# Patient Record
Sex: FEMALE | Race: WHITE | NOT HISPANIC OR LATINO | Employment: FULL TIME | URBAN - METROPOLITAN AREA
[De-identification: names, ages, dates, MRNs, and addresses within clinical notes are randomized per-mention and may not be internally consistent; named-entity substitution may affect disease eponyms.]

---

## 2023-12-27 ENCOUNTER — HOSPITAL ENCOUNTER (OUTPATIENT)
Dept: BEHAVIORAL HEALTH | Facility: CLINIC | Age: 38
Discharge: HOME | End: 2023-12-27
Attending: FAMILY MEDICINE | Admitting: FAMILY MEDICINE
Payer: COMMERCIAL

## 2023-12-27 PROCEDURE — 90791 PSYCH DIAGNOSTIC EVALUATION: CPT | Performed by: COUNSELOR

## 2023-12-27 RX ORDER — SPIRONOLACTONE 100 MG/1
100 TABLET, FILM COATED ORAL DAILY
COMMUNITY

## 2023-12-27 RX ORDER — BUPROPION HYDROCHLORIDE 450 MG/1
450 TABLET, FILM COATED, EXTENDED RELEASE ORAL DAILY
COMMUNITY

## 2023-12-27 RX ORDER — CLONAZEPAM 0.5 MG/1
0.5 TABLET, ORALLY DISINTEGRATING ORAL 2 TIMES DAILY PRN
COMMUNITY

## 2023-12-27 RX ORDER — CETIRIZINE HYDROCHLORIDE 5 MG/1
5 TABLET ORAL DAILY
COMMUNITY

## 2023-12-27 RX ORDER — MULTIVITAMIN,THERAPEUTIC
1 TABLET ORAL DAILY
COMMUNITY

## 2023-12-27 RX ORDER — BUSPIRONE HYDROCHLORIDE 30 MG/1
30 TABLET ORAL 2 TIMES DAILY
COMMUNITY

## 2023-12-27 ASSESSMENT — ANXIETY QUESTIONNAIRES
6. BECOMING EASILY ANNOYED OR IRRITABLE: MORE THAN HALF THE DAYS
3. WORRYING TOO MUCH ABOUT DIFFERENT THINGS: MORE THAN HALF THE DAYS
1. FEELING NERVOUS, ANXIOUS, OR ON EDGE: SEVERAL DAYS
GAD7 TOTAL SCORE: 9
2. NOT BEING ABLE TO STOP OR CONTROL WORRYING: MORE THAN HALF THE DAYS
GAD7 TOTAL SCORE: 9
7. FEELING AFRAID AS IF SOMETHING AWFUL MIGHT HAPPEN: SEVERAL DAYS
4. TROUBLE RELAXING: SEVERAL DAYS
IF YOU CHECKED OFF ANY PROBLEMS ON THIS QUESTIONNAIRE, HOW DIFFICULT HAVE THESE PROBLEMS MADE IT FOR YOU TO DO YOUR WORK, TAKE CARE OF THINGS AT HOME, OR GET ALONG WITH OTHER PEOPLE: SOMEWHAT DIFFICULT
5. BEING SO RESTLESS THAT IT IS HARD TO SIT STILL: NOT AT ALL

## 2023-12-27 ASSESSMENT — COLUMBIA-SUICIDE SEVERITY RATING SCALE - C-SSRS
6. HAVE YOU EVER DONE ANYTHING, STARTED TO DO ANYTHING, OR PREPARED TO DO ANYTHING TO END YOUR LIFE?: NO
2. HAVE YOU ACTUALLY HAD ANY THOUGHTS OF KILLING YOURSELF IN THE PAST MONTH?: YES
4. HAVE YOU HAD THESE THOUGHTS AND HAD SOME INTENTION OF ACTING ON THEM?: NO
2. HAVE YOU ACTUALLY HAD ANY THOUGHTS OF KILLING YOURSELF LIFETIME?: NO
1. IN THE PAST MONTH, HAVE YOU WISHED YOU WERE DEAD OR WISHED YOU COULD GO TO SLEEP AND NOT WAKE UP?: YES
1. IN THE PAST MONTH, HAVE YOU WISHED YOU WERE DEAD OR WISHED YOU COULD GO TO SLEEP AND NOT WAKE UP?: NO
3. HAVE YOU BEEN THINKING ABOUT HOW YOU MIGHT KILL YOURSELF?: NO
5. HAVE YOU STARTED TO WORK OUT OR WORKED OUT THE DETAILS OF HOW TO KILL YOURSELF? DO YOU INTEND TO CARRY OUT THIS PLAN?: NO

## 2023-12-27 ASSESSMENT — PATIENT HEALTH QUESTIONNAIRE - PHQ9: SUM OF ALL RESPONSES TO PHQ QUESTIONS 1-9: 13

## 2023-12-27 ASSESSMENT — PAIN SCALES - GENERAL: PAINLEVEL: NO PAIN (0)

## 2023-12-27 NOTE — PROGRESS NOTES
LOCUS Worksheet     Name: Mariah Gaytan MRN: [id number]    : 1985      Gender:  female    PMI:  NA   Provider Name: [facility]   Provider NPI:  [id number]    Actual level of Care Provided:  therapy and psychiatry    Service(s) receiving or referred to:  IOP    Reason for Variance: Due to worsening mental health symptoms, decline in functioning and suicidal ideations.      Rating completed by: Nico Montes De Oca LPCC/GLORIA      I. Risk of Harm:   2      Low Risk of Harm    II. Functional Status:   3      Moderate Impairment    III. Co-Morbidity:   2      Minor Co-Morbidity    IV - A. Recovery Environment - Level of Stress:   3      Moderately Stress Environment    IV - B. Recovery Environment - Level of Support:   2      Supportive Environment    V. Treatment and Recovery History:   3      Moderate to Equivocal Response to Treatment and Recovery Management    VI. Engagement and Recovery Project:   3      Limited Engagement and Recovery       18 Composite Score    Level of Care Recommendation:   17 to 19       High Intensity Community Based Services

## 2023-12-27 NOTE — PROGRESS NOTES
"[facility]      PATIENT'S NAME: Mariah Gaytan  PREFERRED NAME: Mariah  PRONOUNS:   she/her    MRN: [id number]  : 1985  ADDRESS: [address]  [address]  [facility] [address]  [facility]. NUMBER:  [id number]  DATE OF SERVICE: 23  START TIME: 10:30 AM  END TIME: 12:19 PM  PREFERRED PHONE: [phone]  May we leave a program related message: Yes  EMERGENCY CONTACT: was obtained for Bubba Gaytan (brother) phone:[phone] .  SERVICE MODALITY:  In-person    [facility] ADULT Mental Health DIAGNOSTIC ASSESSMENT    Identifying Information:  Patient is a 38 year old,    individual.  Patient was referred for an assessment by self .  Patient attended the session alone.    Chief Complaint:   The reason for seeking services at this time is: \"Mental health has been horrible and also unhappy for a very long time. Patient reports that she went to [facility] for 3 weeks of PHP at the end of Nov to Dec, got denies IOP at [facility] because of occasional use of alcohol and marijuana. She reports that her  was not happy about how she was being treated by [facility]. 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 Patient reports that she is on 26 weeks of medical leave from work as short-term disability.\"   The problem(s) began at the age of 20, manageable but worsening after mom  in . Patient has attempted to resolve these concerns in the past through psychologist, psychiatry, and PHP .    Social/Family History:  Patient reported that she grew up in  [place], moved to [place] in  where she went to the [employer]  . She was raised by biological parents.  Parents  13 years ago when the patient was 25 years old. The patient's  mother  13 years ago. The patient's " father did not remarry and remains single. Patient reported that her childhood was mostly okay.  Patient described her current relationships with family of origin as okay.      The patient describes her cultural background as .  Cultural influences and impact on patient's life structure, values, norms, and healthcare:  none identified .  Contextual influences on patient's health include: NA.  Cultural, Contextual, and socioeconomic factors do not affect the patient's access to services.  These factors will be addressed in the Preliminary Treatment plan.  Patient identified her preferred language to be English. Patient reported that she does not  need the assistance of an  or other support involved in therapy.     Patient reported had no significant delays in developmental tasks.   Patient's highest education level was college graduate. Patient identified the following learning problems: none reported.  Modifications will be used to assist communication in therapy.  Patient reports that she is  able to understand written materials.    Patient reported the following relationship history: single never .  Patient's current relationship status is single for 2013.   Patient identified her sexual orientation as heterosexual.  Patient reported having zero child(elizabeth). Patient identified father, siblings, friends, and  as part of her support system.  Patient identified the quality of these relationships as good.     Patient's current living/housing situation involves staying in own home/apartment. She lives with her oldest brother and reports that housing is stable.     Patient is currently on medical leave from 26 weeks (short-term disability) .  Patient reports that her finances are obtained through employment.  Patient does not identify finances as a current stressor.      Patient reported that she has not been involved with the legal system. Patient denies being on probation / parole  / under the jurisdiction of the court.    Patient's Strengths and Limitations:  Patient identified the following strengths or resources that will help them succeed in treatment: commitment to health and well being, community involvement, exercise routine, friends / good social support, family support, insight, intelligence, and motivation. Things that may interfere with the patient's success in treatment include: financial hardship.     Assessments:  The following assessments were completed by patient for this visit:  PHQ9:       12/27/2023    10:00 AM   PHQ-9 SCORE   PHQ-9 Total Score 13     GAD7:       12/27/2023    10:00 AM   KURT-7 SCORE   Total Score 9     CAGE-AID:       12/27/2023    10:00 AM   CAGE-AID Total Score   Total Score 0     PROMIS 10-Global Health (all questions and answers displayed):       12/27/2023    10:00 AM   PROMIS 10   In general, would you say your health is: 3   In general, would you say your quality of life is: 2   In general, how would you rate your physical health? 3   In general, how would you rate your mental health, including your mood and your ability to think? 1   In general, how would you rate your satisfaction with your social activities and relationships? 2   In general, please rate how well you carry out your usual social activities and roles. (This includes activities at home, at work and in your community, and responsibilities as a parent, child, spouse, employee, friend, etc.) 3   To what extent are you able to carry out your everyday physical activities such as walking, climbing stairs, carrying groceries, or moving a chair? 5   In the past 7 days, how often have you been bothered by emotional problems such as feeling anxious, depressed, or irritable? 4   In the past 7 days, how would you rate your fatigue on average? 3   In the past 7 days, how would you rate your pain on average, where 0 means no pain, and 10 means worst imaginable pain? 3   Global Mental Health Score 7    Global Physical Health Score 15   PROMIS TOTAL - SUBSCORES 22     [place] Suicide Severity Rating Scale (Lifetime/Recent)      12/27/2023    10:00 AM   [place] Suicide Severity Rating (Lifetime/Recent)   Q1 Wish to be Dead (Lifetime) No   Q2 Non-Specific Active Suicidal Thoughts (Lifetime) No   Q1 Wished to be Dead (Past Month) yes   Q2 Suicidal Thoughts (Past Month) yes   Q3 Suicidal Thought Method no   Q4 Suicidal Intent without Specific Plan no   Q5 Suicide Intent with Specific Plan no   Q6 Suicide Behavior (Lifetime) no   Level of Risk per Screen low risk       Personal and Family Medical History:  Patient does not report a family history of mental health concerns.  Patient reports family history is not on file..     Patient does report Mental Health Diagnosis and/or Treatment.  Patient reported the following previous diagnoses which include(s): an Anxiety Disorder and Depression.  Patient reported symptoms began at 20.   Patient has received mental health services in the past: therapy, psychiatry, and partial hospitalization program.  Psychiatric Hospitalizations: [facility] for a week in 2014, had a manic episode due to side effects of medication. .   Patient denies a history of civil commitment.  Patient is receiving other mental health services.  These include psychotherapy with Malou Salmeron every other week, and psychiatry with Dr Carolina Pinto.  Next appointment: just had an appointment with her last week and will follow up soon.       Patient has had a physical exam to rule out medical causes for current symptoms.  Date of last physical exam was within the past year. Client was encouraged to follow up with PCP if symptoms were to develop. The patient has a non-[facility] Primary Care Provider. Their PCP is Vanita Avel[facility].  Patient reports no current medical and/or dental concerns.  Patient denies any issues with pain..   There are not significant appetite / nutritional concerns / weight  changes. These may include: no concerns. Patient reports the following sleep concerns:  staying up too late.   Patient does not report a history of head injury / trauma / cognitive impairment.      Patient reports current meds as:   Outpatient Medications Marked as Taking for the 12/27/23 encounter (Hospital Encounter) [facility]   Medication Sig    buPROPion HCl ER, XL, 450 MG TB24 Take 450 mg by mouth daily    busPIRone HCl (BUSPAR) 30 MG tablet Take 15 mg by mouth 2 times daily    cetirizine (ZYRTEC) 5 MG tablet Take 5 mg by mouth daily    clonazePAM (KLONOPIN) 0.5 MG ODT Take 0.5 mg by mouth 2 times daily as needed for anxiety    levonorgestrel (MIRENA) 52 MG (20 mcg/day) IUD by Intrauterine route once    multivitamin, therapeutic (THERA-VIT) TABS tablet Take 1 tablet by mouth daily    spironolactone (ALDACTONE) 100 MG tablet Take 100 mg by mouth daily       Medication Adherence:  Patient reports taking prescribed medications as prescribed.    Patient Allergies:  Not on File    Medical History:  No past medical history on file.      Current Mental Status Exam:   Appearance:  Appropriate    Eye Contact:  Good   Psychomotor:  Normal       Gait / station:  no problem  Attitude / Demeanor: Cooperative  Interested  Speech      Rate / Production: Normal/ Responsive      Volume:  Normal  volume      Language:  intact  Mood:   Anxious  Depressed   Affect:   Appropriate    Thought Content: Clear   Thought Process: Coherent  Goal Directed       Associations: No loosening of associations  Insight:   Good   Judgment:  Intact   Orientation:  Person Place Time Situation  Attention/concentration: Good    Substance Use:   Patient did report a family history of substance use concerns; see medical history section for details.  Patient has not received chemical dependency treatment in the past.  Patient has never been to detox.      Patient is not currently receiving any chemical dependency treatment.  Patient reported the following problems as a result of her substance use:   none reported .    Patient reports drinking 1-2 drinks twice a week  Patient denies using tobacco.  Patient reports using THC gummies once or twice weekly  Patient reports drinking a strong cup of tea every mnorning.  Patient reports using/abusing the following substance(s). Patient reported no other substance use.     Substance Use: No symptoms    Based on the negative CAGE score and clinical interview there  are indications of drug or alcohol abuse. Recommendation for substance abuse disorder evaluation with a substance use professional was given. Therapist did recommend client to reduce use or abstain from alcohol or substance use. Therapist did not recommend structured treatment and or community support (AA, 12 step group, etc.). NA .    Significant Losses / Trauma / Abuse / Neglect Issues:   Patient did not serve in the .  There are indications or report of significant loss, trauma, abuse or neglect issues related to: death of her mother in 2010 .  Concerns for possible neglect are not present.     Safety Assessment:   Patient denies current homicidal ideation and behaviors.  Patient denies current self-injurious ideation and behaviors.    Patient denied risk behaviors associated with substance use.   Patient reported substance use associated with mental health symptoms.  Patient reports the following current concerns for her personal safety: None.  Patient reports there are no firearms in the house.       There are no firearms in the home..    History of Safety Concerns:  Patient denied a history of homicidal ideation.     Patient denied a history of personal safety concerns.    Patient denied a history of assaultive behaviors.    Patient denied a history of sexual assault behaviors.     Patient denied a history of risk behaviors associated with substance use.  Patient reported a history of substance use associated with mental  health symptoms.  Patient reports the following protective factors:  forward or future oriented thinking; dedication to family or friends; safe and stable environment; regular sleep; effectively controls impulses; sense of belonging; purpose; help seeking behaviors when distressed; adherence with prescribed medication; agreement to use safety plan; living with other people; uses community crisis resources; effective problem solving skills; commitment to well-being; sense of meaning; positive social skills; healthy fear of risky behaviors or pain; strong sense of self-worth or esteem; sense of personal control or determination; access to a variety of clinical interventions.     Risk Plan:  See Recommendations for Safety and Risk Management Plan    Review of Symptoms per patient report:   Depression: Change in sleep, Lack of interest, Excessive or inappropriate guilt, Change in energy level, Suicidal ideation, Feelings of hopelessness, Feelings of helplessness, Low self-worth, Ruminations, Irritability, Feeling sad, down, or depressed, Withdrawn, Poor hygeine, and Frequent crying  Telma:  No Symptoms  Psychosis: No Symptoms  Anxiety: Excessive worry, Nervousness, Physical complaints, such as headaches, stomachaches, muscle tension, Social anxiety, Sleep disturbance, Ruminations, and Irritability  Panic:  No symptoms  Post Traumatic Stress Disorder:  No Symptoms   Eating Disorder: No Symptoms  ADD / ADHD:  No symptoms  Conduct Disorder: No symptoms  Autism Spectrum Disorder: No symptoms  Obsessive Compulsive Disorder: No Symptoms    Patient reports the following compulsive behaviors and treatment history:  none reported .      Diagnostic Criteria:     Generalized Anxiety Disorder  A. Excessive anxiety and worry about a number of events or activities (such as work or school performance).   B. The person finds it difficult to control the worry.  C. Select 3 or more symptoms (required for diagnosis). Only one item is  required in children.   - Restlessness or feeling keyed up or on edge.    - Being easily fatigued.    - Difficulty concentrating or mind going blank.    - Irritability.    - Muscle tension.    - Sleep disturbance (difficulty falling or staying asleep, or restless unsatisfying sleep).   D. The focus of the anxiety and worry is not confined to features of an Axis I disorder.  E. The anxiety, worry, or physical symptoms cause clinically significant distress or impairment in social, occupational, or other important areas of functioning.   F. The disturbance is not due to the direct physiological effects of a substance (e.g., a drug of abuse, a medication) or a general medical condition (e.g., hyperthyroidism) and does not occur exclusively during a Mood Disorder, a Psychotic Disorder, or a Pervasive Developmental Disorder.     Major Depressive Disorder  CRITERIA (A-C) REPRESENT A MAJOR DEPRESSIVE EPISODE - SELECT THESE CRITERIA  A) Recurrent episode(s) - symptoms have been present during the same 2-week period and represent a change from previous functioning 5 or more symptoms (required for diagnosis)   - Depressed mood. Note: In children and adolescents, can be irritable mood.     - Diminished interest or pleasure in all, or almost all, activities.    - Decreased sleep.     - Fatigue or loss of energy.    - Feelings of worthlessness or inappropriate guilt.    - Recurrent thoughts of death (not just fear of dying), recurrent suicidal ideation without a specific plan, or a suicide attempt or a specific plan for committing suicide.   B) The symptoms cause clinically significant distress or impairment in social, occupational, or other important areas of functioning  C) The episode is not attributable to the physiological effects of a substance or to another medical condition  D) The occurence of major depressive episode is not better explained by other thought / psychotic disorders  E) There has never been a manic episode  or hypomanic episode    Functional Status:  Patient reports the following functional impairments:  self-care, social interactions, and work / vocational responsibilities.     Programmatic care:  Current LOCUS was assigned and patient needs the following level of care based on score 18  .    Clinical Summary:  1. Psychosocial, Cultural and Contextual Factors: isolation, short term disability.  2. Principal DSM5 Diagnoses  (Sustained by DSM5 Criteria Listed Above):   296.32 (F33.1) Major Depressive Disorder, Recurrent Episode, Moderate _ and With melancholic features.  3. Other Diagnoses that is relevant to services:   300.02 (F41.1) Generalized Anxiety Disorder.  4. Provisional Diagnosis: none.  5. Prognosis: Expect Improvement and Relieve Acute Symptoms.  6. Likely consequences of symptoms if not treated: patient's ongoing symptoms are more than likely to get worse and experience a decreased daily in functioning and may require a higher level of care.  7. Client strengths include:  caring, creative, educated, empathetic, employed, goal-focused, good listener, has a previous history of therapy, insightful, intelligent, motivated, open to learning, open to suggestions / feedback, support of family, friends and providers, supportive, wants to learn, willing to ask questions, and willing to relate to others .     Recommendations:     1. Plan for Safety and Risk Management:   Safety and Risk: A safety and risk management plan has been developed including: Patient consented to co-developed safety plan.  Safety and risk management plan was completed - see below.  Patient agreed to use safety plan should any safety concerns arise.  A copy was given to the patient..          Report to child / adult protection services was NA.     2. Patient's not identified aith / Taoist / spiritual influences relevant to programming at this time.    3. Initial Treatment will focus on:   Depressed Mood -    Anxiety -    Functional  "Impairment at: work  Risk Management / Safety Concerns related to: Suicidal ideation.     4. Resources/Service Plan:    services are not indicated.   Modifications to assist communication are not indicated.   Additional disability accommodations are not indicated.      5. Collaboration:   Collaboration / coordination of treatment will be initiated with the following  support professionals: Targeted Case Management (TCM).      6.  Referrals:   The following referral(s) will be initiated: Outpatient Mental Health Therapy Group.       A Release of Information has been obtained for the following:  EC listed above .     Clinical Substantiation/medical necessity for the above recommendations:  Patient is a 38-year-old heterosexual  single female who presents with a history of anxiety disorder, and Depression. Patient is seeking services currently due to \"Mental health has been horrible and also unhappy for a very long time. Patient reports that she went to [facility] at [facility] for 3 weeks of PHP at the end of Nov to Dec, got denies IOP at [facility] because of occasional use of alcohol and marijuana. She reports that her  was not happy about how she was being treated by [facility]. 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 Patient reports that she is on 26 weeks of medical leave from work as short-term disability.\" Patient has received mental health services in the past: therapy, psychiatry, and partial hospitalization program.  Psychiatric Hospitalizations: [facility] for a [facility] in , had a manic episode due to side effects of medication   Patient denies a history of civil commitment, psychiatric hospitalization or substance use disorder treatment.  Patient is receiving psychotherapy with Malou Salmeron every other week, and " psychiatry with Dr Carolina Pinto.  Next appointment: just had an appointment with her last week and will follow up soon.        Patient endorses with Change in sleep, Lack of interest, Excessive or inappropriate guilt, change in energy level, Suicidal ideation, Feelings of hopelessness, Feelings of helplessness, Low self-worth, Ruminations, Irritability, feeling sad, down, or depressed, Withdrawn, Poor hygiene, and Frequent crying. Excessive worry, Nervousness, Physical complaints, such as headaches, stomachaches, muscle tension, social anxiety, Sleep disturbance, Ruminations, and Irritability.     Patient denies any prior suicidal behaviors but admits to current suicidal ideations w/o plan in the past month. She was engaged in safety planning and identified numerous protective factors. Patient agrees with referral to [facility] and a referral has been made through the navigation process for programming. Patient's acute suicide risk was determined to be low due to the following factors: Admission of current suicidal ideations w/o plan and has no past suicidal behaviors. Patient is not currently under the influence of alcohol or illicit substances, denies experiencing command hallucinations, and has no direct access to firearms. Patient's acute risk could be higher if noncompliant with treatment plan, medications, follow-up appointments or using illicit substances or alcohol. Protective factors include forward or future oriented thinking; dedication to family or friends; safe and stable environment; regular sleep; effectively controls impulses; sense of belonging; purpose; help seeking behaviors when distressed; adherence with prescribed medication; agreement to use safety plan; living with other people; uses community crisis resources; effective problem solving skills; commitment to well-being; sense of meaning; positive social skills; healthy fear of risky behaviors or pain; strong sense of  "self-worth or esteem; sense of personal control or determination; access to a variety of clinical interventions. Patient reports suicidal ideations w/o plan in the past month and has no past suicidal behaviors, and is not currently using illicit substances, therefore, a safety plan was developed. Patient instructed to present to her nearest emergency room if symptoms get exacerbate.    7. GISELLE:    GISELLE:  Discussed the general effects of drugs and alcohol on health and well-being. Provider gave patient printed information about the effects of chemical use on their health and well being. Recommendations:  none.     8. Records:   These were reviewed at time of assessment.   Information in this assessment was obtained from the medical record and  provided by patient who is a fair historian.    Patient will have open access to their mental health medical record.    9.   Interactive Complexity: No    10. Safety Plan:  When the patient identifies the following:  Wish to die    The following is recommended:   Complete/Review/Update Safety Plan    Safety Plan:  Adult Long Safety Plan:     [facility]                                       Mariah Gaytan     SAFETY PLAN:  Step 1: Warning signs / cues (Thoughts, images, mood, situation, behavior) that a crisis may be developing:  Thoughts: \"I don't matter\", \"People would be better off without me\", \"I'm a burden\", \"I can't do this anymore\", \"I just want this to end\", and \"Nothing makes it better\"  Images: obsessive thoughts of death or dying: ideations and passive thoughts  Thinking Processes: ruminations (can't stop thinking about my problems): of the past, racing thoughts, intrusive thoughts (bothersome, unwanted thoughts that come out of nowhere):  , and highly critical and negative thoughts: of self  Mood: worsening depression, hopelessness, helplessness, and intense worry  Behaviors: isolating/withdrawing , using drugs, " "using alcohol, can't stop crying, and not taking care of my responsibilities  Situations: loss: her mother and changes in symptoms: depression and anxiety    Step 2: Coping strategies - Things I can do to take my mind off of my problems without contacting another person (relaxation technique, physical activity):  Distress Tolerance Strategies:  arts and crafts:  , listen to positive and upbeat music:  , change body temperature (ice pack/cold water) , and paced breathing/progressive muscle relaxation  Physical Activities: meditation and deep breathing  Focus on helpful thoughts:  \"This is temporary\", \"I will get through this\", \"It always passes\", and \"Ride the wave\"  Step 3: People and social settings that provide distraction:   Name: therapist, father, brothers and friends Phone: NA   Name: FAUSTINO Phone: NA   Name: NA Phone: NA  coffee shop, volunteering, community center, gym , and work   Step 4: Remind myself of people and things that are important to me and worth living for:  friends and family.      Step 5: When I am in crisis, I can ask these people to help me use my safety plan:   Name: therapist, father, brothers and friends Phone: NA   Name: NA Phone: NA   Name: NA Phone: NA  Step 6: Making the environment safe:   be around others  Step 7: Professionals or agencies I can contact during a crisis:  Suicide Prevention Lifeline: Call or Text 880   Local Crisis Services: [facility]  [phone] (adults)  [phone] (children)    Call 911 or go to my nearest emergency department.   I helped develop this safety plan and agree to use it when needed.  I have been given a copy of this plan.      Client signature _________________________________________________________________  Today s date:  12/27/2023  Completed by Provider Name/ Credentials:  SHANTELL Bolton/GLORIA  December 27, 2023  Adapted from Safety Plan Template 2008 Ayse Urbina and Matthew Jiang is reprinted with the express " permission of the authors.  No portion of the Safety Plan Template may be reproduced without the express, written permission.  You can contact the authors at [email or web address] or [email or web address].        Provider Name/ Credentials:  SHANTELL Bolton, GLORIA  Dual   Phone: [phone]  Fax: [phone]     December 27, 2023

## 2023-12-28 ENCOUNTER — TELEPHONE (OUTPATIENT)
Dept: BEHAVIORAL HEALTH | Facility: CLINIC | Age: 38
End: 2023-12-28
Payer: COMMERCIAL

## 2023-12-28 NOTE — TELEPHONE ENCOUNTER
----- Message from Genna Mcdowell sent at 12/28/2023 10:08 AM CST -----  Regarding: Referral for IOP1-[facility] Mornings  Adult Mental Health Programmatic Care Schedule Request    Patient Name: Mariah Gaytan  Location of programming: [facility]  Start Date: 01/02/2024      Adult Program Group: Adult Program Group: IOP 1 General Mood Track  [[id number]]  Schedule:  M, T, W, Th 9am- 12noon  12 hours per week for 9 weeks  Number of visits to be scheduled: 36 days    Attending Provider (MD):  Dr Adin Hannon.  Visit Type:  In-Person    Accommodations Needed: No  Alerts Identified/Substantiation: No  Consulted with Supervisor: No

## 2024-01-03 ENCOUNTER — TELEPHONE (OUTPATIENT)
Dept: BEHAVIORAL HEALTH | Facility: CLINIC | Age: 39
End: 2024-01-03
Payer: COMMERCIAL

## 2024-01-03 ENCOUNTER — TELEPHONE (OUTPATIENT)
Dept: BEHAVIORAL HEALTH | Facility: CLINIC | Age: 39
End: 2024-01-03

## 2024-01-03 ASSESSMENT — PATIENT HEALTH QUESTIONNAIRE - PHQ9
SUM OF ALL RESPONSES TO PHQ QUESTIONS 1-9: 9
SUM OF ALL RESPONSES TO PHQ QUESTIONS 1-9: 9
10. IF YOU CHECKED OFF ANY PROBLEMS, HOW DIFFICULT HAVE THESE PROBLEMS MADE IT FOR YOU TO DO YOUR WORK, TAKE CARE OF THINGS AT HOME, OR GET ALONG WITH OTHER PEOPLE: SOMEWHAT DIFFICULT

## 2024-01-03 NOTE — TELEPHONE ENCOUNTER
----- Message from Genna Mcdowell sent at 12/28/2023 12:58 PM CST -----  Regarding: RE: Referral for IOP1-[facility] Mornings  Hello-  Bumarcoer-  Ok- I will call her now and obtain her new insurance for 2024 and update accordingly.    Thank you!  -Genna    ----- Message -----  From: See Og [facility]  Sent: 12/28/2023  12:21 PM CST  To: Genna Mcdowell; Sandra Bustamante [facility]; #  Subject: RE: Referral for IOP1-[facility] Mornings                Hello:    Pt is currently active with [employer]. We were advised by our billing department that starting 01/01/2024, this plan will no longer exist.     Please reach out to patient regarding new insurance for Jan 2024.     Thanks.  ----- Message -----  From: Genna Mcdowell  Sent: 12/28/2023  10:11 AM CST  To: Sandra Bustamante [facility]; #  Subject: Referral for IOP1-[facility] Mornings                    Adult Mental Health Programmatic Care Schedule Request    Patient Name: Mariah Gaytan  Location of programming: [facility]  Start Date: 01/02/2024      Adult Program Group: Adult Program Group: IOP 1 General Mood Track  [[id number]]  Schedule:  M, T, W, Th 9am- 12noon  12 hours per week for 9 weeks  Number of visits to be scheduled: 36 days    Attending Provider (MD):  Dr Adin Hannon.  Visit Type:  In-Person    Accommodations Needed: No  Alerts Identified/Substantiation: No  Consulted with Supervisor: No

## 2024-01-04 ENCOUNTER — HOSPITAL ENCOUNTER (OUTPATIENT)
Dept: BEHAVIORAL HEALTH | Facility: CLINIC | Age: 39
Discharge: HOME | End: 2024-01-04
Attending: PSYCHIATRY & NEUROLOGY
Payer: COMMERCIAL

## 2024-01-04 PROBLEM — F33.1 MAJOR DEPRESSIVE DISORDER, RECURRENT EPISODE, MODERATE (H): Status: ACTIVE | Noted: 2024-01-04

## 2024-01-04 PROCEDURE — 90853 GROUP PSYCHOTHERAPY: CPT

## 2024-01-04 ASSESSMENT — PATIENT HEALTH QUESTIONNAIRE - PHQ9
5. POOR APPETITE OR OVEREATING: SEVERAL DAYS
SUM OF ALL RESPONSES TO PHQ QUESTIONS 1-9: 9

## 2024-01-04 ASSESSMENT — ANXIETY QUESTIONNAIRES
6. BECOMING EASILY ANNOYED OR IRRITABLE: MORE THAN HALF THE DAYS
GAD7 TOTAL SCORE: 7
5. BEING SO RESTLESS THAT IT IS HARD TO SIT STILL: NOT AT ALL
GAD7 TOTAL SCORE: 7
IF YOU CHECKED OFF ANY PROBLEMS ON THIS QUESTIONNAIRE, HOW DIFFICULT HAVE THESE PROBLEMS MADE IT FOR YOU TO DO YOUR WORK, TAKE CARE OF THINGS AT HOME, OR GET ALONG WITH OTHER PEOPLE: SOMEWHAT DIFFICULT
3. WORRYING TOO MUCH ABOUT DIFFERENT THINGS: SEVERAL DAYS
2. NOT BEING ABLE TO STOP OR CONTROL WORRYING: SEVERAL DAYS
7. FEELING AFRAID AS IF SOMETHING AWFUL MIGHT HAPPEN: SEVERAL DAYS
1. FEELING NERVOUS, ANXIOUS, OR ON EDGE: SEVERAL DAYS

## 2024-01-04 NOTE — GROUP NOTE
Psychotherapy Group Note    PATIENT'S NAME: Mariah Gaytan  MRN:   [id number]  :   1985  ACCT. NUMBER: [id number]  DATE OF SERVICE: 24  START TIME: 11:00 AM  END TIME: 11:50 AM  FACILITATOR: Bernice Mclain LICSW  TOPIC: MH EBP Group: Behavioral Activation  [facility] Adult Mental Health Outpatient Programs  TRACK: IOP 1    NUMBER OF PARTICIPANTS: 6    Summary of Group / Topics Discussed:  Behavioral Activation: [place]: {Patients identified situations that prompt unwanted and unhelpful emotions / thoughts / behaviors.   Patients discussed how to problem solve by proactively using coping skills in potentially difficult situations. Components included describing the situation, brainstorming coping skills, imagining how scenario can/will unfold, rehearsing the action plan, and practicing relaxation to follow.  Patients practiced using these skills to reduce symptom distress and increase effective coping  behaviors.      Patient Session Goals / Objectives:  Identify difficult situation(s), and gain proficiency with alternative behaviors / skills to problem solve.  Increase confidence using coping skills through group practice in session.  Receive and provide feedback regarding skill development.  Apply coping skills in daily life situations.      Patient Participation / Response:  Fully participated with the group by sharing personal reflections / insights and openly received / provided feedback with other participants.    Demonstrated understanding of topics discussed through group discussion and participation, Expressed understanding of the relationship between behaviors, thoughts, and feelings, Shared experiences and challenges with making behavioral changes, Identified barriers to change, Identified / Expressed personal readiness to make behavioral change, and Identified ways to increase goal directed activities    Treatment Plan:  Patient has an initial individualized treatment plan that was  created as part of their diagnostic assessment / admission process.  A master individualized treatment plan is in the process of being developed with the patient and multi-disciplinary care team.    WILLIAMS GallegoSW

## 2024-01-04 NOTE — GROUP NOTE
Process Group Note    PATIENT'S NAME: Mariah Gaytan  MRN:   [id number]  :   1985  ACCT. NUMBER: [id number]  DATE OF SERVICE: 24  START TIME:  9:00 AM  END TIME:  9:50 AM  FACILITATOR: Katina Peraza LICSW  TOPIC:  Process Group    Diagnoses:  Principal DSM5 Diagnoses  (Sustained by DSM5 Criteria Listed Above):   296.32 (F33.1) Major Depressive Disorder, Recurrent Episode, Moderate _ and With melancholic features.  3. Other Diagnoses that is relevant to services:   300.02 (F41.1) Generalized Anxiety Disorder.    [facility] Mental Health Outpatient Programs  TRACK: IOP 1    NUMBER OF PARTICIPANTS: 7        Data:    Session content: At the start of this group, patients were invited to check in by identifying themselves, describing their current emotional status, and identifying issues to address in this group.   Area(s) of treatment focus addressed in this session included Symptom Management and Personal Safety.  Pt is welcomed and oriented to the group on this her first day in the program. Pt is struggling with depression and recently completed PHP at [facility]. She reports feeling anxious about starting a new group. Pt is worried about short term disability status and makes a plan to call the managing company today. 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 Pt identifies coping skills as eating a good meal and watching a movie. Denies safety concerns.    Therapeutic Interventions/Treatment Strategies:  Psychotherapist offered support, feedback and validation and reinforced use of skills. Treatment modalities used include Cognitive Behavioral Therapy. Interventions include Behavioral Activation: Explored how behaviors effect mood and interact with thoughts and feelings, Cognitive Restructuring:  Explored impact of ineffective thoughts / distortions on mood and activity and Assisted patient in identifying new neutral/positive core beliefs, and Coping  Skills: Discussed use of self-soothe skills to decrease distress in the body.    Assessment:    Patient response:   Patient responded to session by accepting feedback, giving feedback, listening, focusing on goals, being attentive, and accepting support    Possible barriers to participation / learning include: and no barriers identified    Health Issues:   None reported       Substance Use Review:   Substance Use: No active concerns identified.    Mental Status/Behavioral Observations  Appearance:   Appropriate   Eye Contact:   Good   Psychomotor Behavior: Normal   Attitude:   Cooperative   Orientation:   All  Speech   Rate / Production: Normal    Volume:  Normal   Mood:    Angry  Anxious  Depressed   Affect:    Worrisome   Thought Content:   Rumination  Thought Form:  Coherent     Insight:    Good     Plan:   Safety Plan: No current safety concerns identified.  Recommended that patient call 911 or go to the local ED should there be a change in any of these risk factors.   Barriers to treatment: None identified  Patient Contracts (see media tab):  None  Substance Use: Not addressed in session   Continue or Discharge: Patient will continue in Adult Day Treatment (ADT)  as planned. Patient is likely to benefit from learning and using skills as they work toward the goals identified in their treatment plan.      Katina Back, MaineGeneral Medical CenterSW  January 4, 2024

## 2024-01-04 NOTE — GROUP NOTE
Psychotherapy Group Note    PATIENT'S NAME: Mariah Gaytan  MRN:   [id number]  :   1985  ACCT. NUMBER: [id number]  DATE OF SERVICE: 24  START TIME: 10:00 AM  END TIME: 10:50 AM  FACILITATOR: Katina Peraza LICSW  TOPIC: MH EBP Group: Self-Awareness  [facility] Mental Health Outpatient Programs  TRACK: IOP 1    NUMBER OF PARTICIPANTS: 7    Summary of Group / Topics Discussed:  Self-Awareness: Self-Compassion: Patients received overview of key concepts in developing self-compassion. Patients discussed mindfulness, self-kindness, and finding common humanity. Patients identified their current approach to problems in their lives and learned skills for increasing self-compassion. Patients identified ways they can put self-compassion skills into practice and problem solve barriers to application of skills.     Patient Session Goals / Objectives:  [place]evering components of self-compassion  Identify ways to practice self-compassion in daily life  Problem solve barriers to self-compassion practice      Patient Participation / Response:  Fully participated with the group by sharing personal reflections / insights and openly received / provided feedback with other participants.    Demonstrated understanding of topics discussed through group discussion and participation, Demonstrated understanding of values, strengths, and challenges to learn about themselves, and Identified / Expressed readiness to act intentionally, increase self-compassion, promote personal growth    Treatment Plan:  Patient has an initial individualized treatment plan that was created as part of their diagnostic assessment / admission process.  A master individualized treatment plan is in the process of being developed with the patient and multi-disciplinary care team.    CATARINA Chandler

## 2024-01-08 ENCOUNTER — HOSPITAL ENCOUNTER (OUTPATIENT)
Dept: BEHAVIORAL HEALTH | Facility: CLINIC | Age: 39
Discharge: HOME | End: 2024-01-08
Attending: PSYCHIATRY & NEUROLOGY
Payer: COMMERCIAL

## 2024-01-08 DIAGNOSIS — F33.1 MAJOR DEPRESSIVE DISORDER, RECURRENT EPISODE, MODERATE (H): Primary | ICD-10-CM

## 2024-01-08 DIAGNOSIS — F41.1 GENERALIZED ANXIETY DISORDER: ICD-10-CM

## 2024-01-08 PROCEDURE — 90853 GROUP PSYCHOTHERAPY: CPT

## 2024-01-08 PROCEDURE — 99204 OFFICE O/P NEW MOD 45 MIN: CPT | Performed by: PSYCHIATRY & NEUROLOGY

## 2024-01-08 RX ORDER — BIOTIN 1 MG
1000 TABLET ORAL DAILY
COMMUNITY

## 2024-01-08 NOTE — GROUP NOTE
Psychotherapy Group Note    PATIENT'S NAME: Mariah Gaytan  MRN:   [id number]  :   1985  ACCT. NUMBER: [id number]  DATE OF SERVICE: 24  START TIME: 10:00 AM  END TIME: 10:50 AM  FACILITATOR: Katina Peraza LICSW  TOPIC: MH EBP Group: Coping Skills  [facility] Adult Mental Health Outpatient Programs  TRACK: IOP 1    NUMBER OF PARTICIPANTS: 3    Summary of Group / Topics Discussed:  Coping Skills: Additional Coping Skills:  Patients discussed the mental health benefits of laughter.  Reviewed the benefits of applying the aforementioned coping strategies.  Patients explored how these strategies might be applied to daily stressors or distressing situations.    Patient Session Goals / Objectives:  Understand the purpose and benefits of applying humor coping strategies  Identify ways to increase laughter in daily life  Address barriers to utilizing coping skills when in distress.      Patient Participation / Response:  Fully participated with the group by sharing personal reflections / insights and openly received / provided feedback with other participants.    Demonstrated understanding of topics discussed through group discussion and participation, Expressed understanding of the relevance / importance of coping skills at distressing times in life, Demonstrated knowledge of when to consider using a variety of coping skills in daily life, and Identified barriers to applying coping skills    Treatment Plan:  Patient has an initial individualized treatment plan that was created as part of their diagnostic assessment / admission process.  A master individualized treatment plan is in the process of being developed with the patient and multi-disciplinary care team.    CATARINA Chandler

## 2024-01-08 NOTE — GROUP NOTE
Psychotherapy Group Note    PATIENT'S NAME: Mariah Gaytan  MRN:   [id number]  :   1985  ACCT. NUMBER: [id number]  DATE OF SERVICE: 24  START TIME: 11:00 AM  END TIME: 11:50 AM  FACILITATOR: Bernice Mclain LICSW  TOPIC:  EBP Group: Emotions Management  [facility] Mental Health Outpatient Programs  TRACK: IOP 1    NUMBER OF PARTICIPANTS: 3    Summary of Group / Topics Discussed:  Emotions Management: Opposite to Emotion: Patients discussed past and present struggles with knowing how to make changes in their lives due to difficult emotional experiences.  Explored desires to experience and feel less anger, sadness, guilt, and fear.  Reviewed the therapeutic skill of opposite action and patients explored opportunities to use their behaviors as a tool to reduce an emotion that they want to change.     Patient Session Goals / Objectives:  Review DBT concepts and focus on patient s experiences of distress and difficult emotional experiences.  Learn how to do the opposite of what an emotion makes us want to do in an effort to decrease an unwanted emotional experience.  Demonstrate understanding of the skill of opposite action by sharing experiences where the technique could be useful in past / present situations.      Patient Participation / Response:  Fully participated with the group by sharing personal reflections / insights and openly received / provided feedback with other participants.    Demonstrated understanding of topics discussed through group discussion and participation, Expressed understanding of the relevance / importance of emotions management skills at distressing times in life, Self-aware of experiences with difficult emotions, and strategies to employ to manage them, Demonstrated knowledge of when to consider applying a variety of emotions management skills in daily life, Demonstrated understanding and practice strategies to manage difficult emotions and move towards healing,  Identified barriers to applying emotions management strategies, Identified strategies to overcome barriers to use of emotions management skills, and Identified emotions management strategies that have helped maintain / improve symptoms in the past    Treatment Plan:  Patient has an initial individualized treatment plan that was created as part of their diagnostic assessment / admission process.  A master individualized treatment plan is in the process of being developed with the patient and multi-disciplinary care team.    CATARINA Gallego

## 2024-01-08 NOTE — GROUP NOTE
"Process Group Note    PATIENT'S NAME: Mariah Gaytan  MRN:   [id number]  :   1985  ACCT. NUMBER: [id number]  DATE OF SERVICE: 24  START TIME:  9:00 AM  END TIME:  9:50 AM  FACILITATOR: Katina Peraza LICSW  TOPIC:  Process Group    Diagnoses:  Principal DSM5 Diagnoses  (Sustained by DSM5 Criteria Listed Above):   296.32 (F33.1) Major Depressive Disorder, Recurrent Episode, Moderate _ and With melancholic features.  3. Other Diagnoses that is relevant to services:   300.02 (F41.1) Generalized Anxiety Disorder.    [facility] Mental Health Outpatient Programs  TRACK: IOP 1    NUMBER OF PARTICIPANTS: 4        Data:    Session content: At the start of this group, patients were invited to check in by identifying themselves, describing their current emotional status, and identifying issues to address in this group.   Area(s) of treatment focus addressed in this session included Symptom Management and Personal Safety.  Pt reports feeling \"heavy\" today, due to learning that her favorite aunt's  . She is sad for her aunt. Pt is upset that she has gained weight and cannot find anything to wear to the . The  date has not been set, but she will keep us updated. Pt  feels positive about enjoying  time with friends on Friday. She identifies coping skills as watching favorite tv shows. She sets a goal to go to the gym. Denies safety concerns.    Therapeutic Interventions/Treatment Strategies:  Psychotherapist offered support, feedback and validation and reinforced use of skills. Treatment modalities used include Cognitive Behavioral Therapy. Interventions include Cognitive Restructuring:  Explored impact of ineffective thoughts / distortions on mood and activity and Assisted patient in identifying new neutral/positive core beliefs and Coping Skills: Discussed use of self-soothe skills to decrease distress in the body.    Assessment:    Patient response:   Patient responded to " session by accepting feedback, giving feedback, listening, focusing on goals, being attentive, and accepting support    Possible barriers to participation / learning include: and no barriers identified    Health Issues:   None reported       Substance Use Review:   Substance Use: alcohol .  and Last use: Friday. Pt reports a couple of drinks and denies this being an issue    Mental Status/Behavioral Observations  Appearance:   Appropriate   Eye Contact:   Good   Psychomotor Behavior: Normal   Attitude:   Cooperative   Orientation:   All  Speech   Rate / Production: Normal    Volume:  Normal   Mood:    Anxious  Depressed  Grieving  Affect:    Tearful Worrisome   Thought Content:   Rumination  Thought Form:  Coherent  Logical     Insight:    Good     Plan:   Safety Plan: No current safety concerns identified.  Recommended that patient call 911 or go to the local ED should there be a change in any of these risk factors.   Barriers to treatment: None identified  Patient Contracts (see media tab):  None  Substance Use: Not addressed in session   Continue or Discharge: Patient will continue in Adult Day Treatment (ADT)  as planned. Patient is likely to benefit from learning and using skills as they work toward the goals identified in their treatment plan.      Katina Back, WILLIAMSSW  January 8, 2024

## 2024-01-08 NOTE — H&P
"[facility]   Adult Mental Health Outpatient Programs  Provider Intake Note    Program: Intensive Outpatient Program, track 1    Patient: Mariah Gaytan  MRN: [id number]  : 1985  Acct. No.: [id number]  Date of Service:  24    Chart review:  Diagnostic Assessment dated: 2023  Recent Emergency Department visit documentation: N/A  Recent inpatient discharge summary: N/A    Outpatient Providers:  Current Outpatient Psychiatric Provider: Dr Carolina Pinto    Current Outpatient Individual Psychotherapist: Malou Salmeron    Primary Care Provider: Vanita Barreto      Identifying Data:  Mariah Gaytan, a 38 year old-year-old with reported history of depression, anxiety, presents for initial visit to provide oversight of programmatic care. Patient attended the session alone, uses she/her pronouns, and prefers to be called: \"Mariah\"    Presenting Concern:  Per diagnostic assessment: \"Mental health has been horrible and also unhappy for a very long time...\"    History of Present Illness:  Chart reviewed, history as documented reviewed with Mariah. 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  Today notes:  Sleep: \"not super great\"  Night owl, typically goes to bed after midnight  Was able to entrain when at [employer]  Appetite: \"okay, I try to eat healthy for the most part\"    Activities of Daily Living and Related Systems:  Hygiene: improving, previously more severe difficulties  Socialization: \"I feel like I've ruined a lot of friendships because of my mental health stuff\"  Chores and home upkeep: \"gotten easier since not working\"  Shopping: no difficulties currently  Concerns related to work: see above    Goals for Treatment (in addition to those goals listed in the BEH Treatment Plan Encounter):  \"Continue to build skills\"  \"I'd like to live alone or move to [place]\"  \"I need to take care of myself for me and not [only] so my psychologist can see it\"    Safety Assessment:  Suicidal ideation: has passive suicidal thoughts described as \"I don't want to do this anymore,\"  denies plan/intent  Thoughts of non-suicidal self-injury: denied  Recent self-injurious behavior: denied  Homicidal ideation: denied  Other safety concerns: denied    Substance use:  Small amount of edible or THC beverage  1-2 drink 1-2 times per week  I used to use them to cope a lot more    Medications:  Current Outpatient Medications   Medication Sig Dispense Refill    biotin 1000 MCG TABS tablet Take 1,000 mcg by mouth daily      buPROPion HCl ER, XL, 450 MG TB24 Take 450 mg by mouth daily      busPIRone HCl (BUSPAR) 30 MG tablet Take 30 mg by mouth 2 times daily      cetirizine (ZYRTEC) 5 MG tablet Take 5 mg by mouth daily      clonazePAM (KLONOPIN) 0.5 MG ODT Take 0.5 mg by mouth 2 times daily as needed for anxiety      levonorgestrel (MIRENA) 52 MG (20 mcg/day) IUD by Intrauterine route once      multivitamin, therapeutic (THERA-VIT) TABS tablet Take 1 tablet by mouth daily   "    spironolactone (ALDACTONE) 100 MG tablet Take 100 mg by mouth daily       The above list was reviewed and updated in EPIC with patient today.     Patient is taking medications as prescribed and denies adverse effects    Medical Review of Systems:  Pertinent: PCOS    Recent Screenings and Metrics:  PHQ-9 scores:       12/27/2023    10:00 AM 1/3/2024     4:21 PM 1/4/2024     9:48 AM   PHQ-9 SCORE   PHQ-9 Total Score MyChart  9 (Mild depression)    PHQ-9 Total Score 13 9    9 9       KURT-7 scores:       12/27/2023    10:00 AM 1/4/2024     9:48 AM   KURT-7 SCORE   Total Score 9 7       CSSR-S: [place] Suicide Severity Rating Scale (Lifetime/Recent)      12/27/2023    10:00 AM 1/4/2024     9:00 AM   [place] Suicide Severity Rating (Lifetime/Recent)   Q1 Wish to be Dead (Lifetime) No    Q2 Non-Specific Active Suicidal Thoughts (Lifetime) No    Q1 Wished to be Dead (Past Month) yes yes   Q2 Suicidal Thoughts (Past Month) yes no   Q3 Suicidal Thought Method no    Q4 Suicidal Intent without Specific Plan no    Q5 Suicide Intent with Specific Plan no    Q6 Suicide Behavior (Lifetime) no    Level of Risk per Screen low risk          Psychiatric History:   Outpatient providers listed above.    Otherwise as noted above or in diagnostic assessment.     Substance Use History:  As noted above or in diagnostic assessment.     Past Medical History:  As noted above or in diagnostic assessment.     Labs:  Most recent labs reviewed. Pertinent updates/findings: None.     Family History:   As noted above or in diagnostic assessment.     Social History:   As noted above or in diagnostic assessment.     Legal History:  As noted above or in diagnostic assessment.     Significant Losses / Trauma / Abuse / Neglect Issues:  As noted above or in diagnostic assessment.     Mental Status Examination:  Vital Signs: There were no vitals taken for this visit.   Appearance: appropriately groomed, appears stated age, and in no apparent  "distress.  Attitude: cooperative   Eye Contact: good   Muscle Strength and Tone: no gross abnormalities   Psychomotor Behavior: fidgety   Gait and Station: deferred  Speech: normal rate, production, volume, and rhythm of, decreased prosody  Associations: No loosening of associations  Thought Process: coherent and goal directed  Thought Content: no evidence of psychotic thought, passive suicidal ideation present, no auditory hallucinations present, and no visual hallucinations present  Mood: \"depressed\"  Affect: mood congruent, intensity is blunted, constricted mobility, restricted range, and reactive  Insight: good  Judgment: intact, adequate for safety  Impulse Control: intact  Oriented to: time, place, person, and situation  Attention Span and Concentration: normal  Language: Intact  Recent and Remote Memory: intact to interview. Not formally assessed. No amnesia.  Fund of Knowledge/Assessment of Intelligence: Average  Capacity of Activities of Daily Living: Independent, able to participate in programmatic care services.    DSM5 Diagnosis/es:    ICD-10-CM    1. Major depressive disorder, recurrent episode, moderate (H)  F33.1       2. Generalized anxiety disorder  F41.1           Assessment/Plan:  Mariah presents today for initial provider visit as part of program intake, coordination, and supervision. Longstanding difficulties with anxiety and depression, worse with work stressors and anniversary reaction.     Good support from current medication, no indication for changes. Given the interpersonal and occupational nature of her distress, psychotherapy will be the most impactful intervention. Symptoms interfering with work and daily function, and patient was referred to this level of care by outpatient providers. IOP remains the most appropriate level of care for treatment. Continue program. I will follow up in one month    Depression   Continue current medication  Continue IOP    Anxiety   See above    Safety " Assessment  Today Mariah endorses passive suicidal ideation, denies plan/intent, denies other safety concerns  Mariah is future-oriented and engaged in treatment planning   I do not feel that Mariah meets criteria for a 72-hour involuntary hold and remains appropriate for an outpatient level of care.     Continue therapy as planned:  Enrolled in IOP  Patient continues to meet criteria for recommended level of care.  Patient is expected to make a timely and significant improvement in the presenting acute symptoms as a result of participation in this program.  Patient would be at reasonable risk of requiring a higher level of care in the absence of current services.  Continue with individual therapist as appropriate    Safety plan reviewed.   To the Emergency Department as needed or call after hours crisis line at [phone] or [phone]. [place] Crisis Text Line: Text [place] to 559755  or  Suicide LifeLine Chat: suicidepreLucky [place].org/chat    Follow-up:   schedule an appointment with me or another program provider in approximately in 4 week(s) or sooner if needed.  Can speak with a staff member or call the appropriate program number (see below) to schedule  Follow up with outpatient provider(s) as planned or sooner if needed for acute medical concerns.    Questions or concerns:  Call program line with questions or concerns (see below)  Log[employer] may be used to communicate with your provider, but this is not intended to be used for emergencies.    [facility] Adult Mental Health Program lines:  [facility]: [phone]  Dual Disorder: [phone]  Adult Day Treatment:  [phone]  55+/Intensive Outpatient: [phone]    Community Resources:    National Suicide Prevention Lifeline: 988 from any phone, or 450-803-9329 (TTY: 582.368.9231). Call anytime for help.  (www.suicidepreventionlifeline.org)  [place] on Mental Illness (www.shivani.org): 744.847.2208 or 110-320-9301.   Mental Health  Association (www.mentalhealth.org): 763-626-3143 or 390-889-3611.  [place] Crisis Text Line: Text [place] to 376012  Suicide LifeLine Chat: suicidepreventionlifeline.org/chat    Treatment Objective(s) Addressed in This Session:  One purpose of today's call is for this writer to provide oversight of patient's care while receiving program services. Specific treatment goals addressed included personal safety, symptoms stabilization and management, wellness and mental health, and community resources/discharge planning.     Patient agrees with the current plan of care.    Signed:   Adin Hannon MD   January 8, 2024      Visit Details:  Type of service: In-person    Location (patient and provider): [facility] Adult Mental Health Outpatient Programmatic Care Offices    Level of Medical Decision Making:   - At least 1 chronic problem that is not stable  - Engaged in prescription drug management during visit (discussed any medication benefits, side effects, alternatives, etc.)  Discussion of management or test interpretation with external physician/other qualified healthcare professional/appropriate source - programmatic care multidisciplinary treatment team    This document completed in part using [employer] Vari[employer] dictation software and therefore may contain inadvertent word or phrase substitutions.

## 2024-01-09 ENCOUNTER — HOSPITAL ENCOUNTER (OUTPATIENT)
Dept: BEHAVIORAL HEALTH | Facility: CLINIC | Age: 39
Discharge: HOME | End: 2024-01-09
Attending: PSYCHIATRY & NEUROLOGY
Payer: COMMERCIAL

## 2024-01-09 PROBLEM — F41.1 GENERALIZED ANXIETY DISORDER: Status: ACTIVE | Noted: 2024-01-09

## 2024-01-09 PROCEDURE — 90853 GROUP PSYCHOTHERAPY: CPT

## 2024-01-09 NOTE — GROUP NOTE
Psychotherapy Group Note    PATIENT'S NAME: Mariah Gaytan  MRN:   [id number]  :   1985  ACCT. NUMBER: [id number]  DATE OF SERVICE: 24  START TIME: 10:00 AM  END TIME: 10:50 AM  FACILITATOR: Katina Peraza LICSW  TOPIC: MH EBP Group: Behavioral Activation  [facility] [place] [facility] Mental Health Outpatient Programs  TRACK: IOP 1    NUMBER OF PARTICIPANTS: 4    Summary of Group / Topics Discussed:  Behavioral Activation: Motivation and Procrastination: Patients explored how they currently spend their time, identifying thoughts and feelings that are motivating and serve to increase desired behaviors.  They also examined behaviors that contribute to procrastination.  Different types of procrastination behaviors were identified, and strategies to reduce individual procrastination and increase motivation were explored and practiced.  Patients identified ways to increase goal-directed activities to enhance mood and reduce symptoms.        Patient Session Goals / Objectives:  Identify current patterns of procrastination behavior and how they influence thoughts and moods, and inhibit motivation.  Identify behaviors that can be implemented that contribute to improving thoughts and feelings, motivation, and reduce symptoms.  Identify and develop a plan to increase activities that promote a sense of accomplishment and competence.  Practice scheduling positive activities / behaviors into daily routines.      Patient Participation / Response:  Fully participated with the group by sharing personal reflections / insights and openly received / provided feedback with other participants.    Demonstrated understanding of topics discussed through group discussion and participation, Expressed understanding of the relationship between behaviors, thoughts, and feelings, Shared experiences and challenges with making behavioral changes, and Identified barriers to change    Treatment Plan:  Patient has an initial individualized  treatment plan that was created as part of their diagnostic assessment / admission process.  A master individualized treatment plan is in the process of being developed with the patient and multi-disciplinary care team.    CATARINA Chandler

## 2024-01-09 NOTE — GROUP NOTE
Process Group Note    PATIENT'S NAME: Mariah Gaytan  MRN:   [id number]  :   1985  ACCT. NUMBER: [id number]  DATE OF SERVICE: 24  START TIME:  9:00 AM  END TIME:  9:50 AM  FACILITATOR: Katina Peraza LICSW  TOPIC:  Process Group    Diagnoses:  Principal DSM5 Diagnoses  (Sustained by DSM5 Criteria Listed Above):   296.32 (F33.1) Major Depressive Disorder, Recurrent Episode, Moderate _ and With melancholic features.  3. Other Diagnoses that is relevant to services:   300.02 (F41.1) Generalized Anxiety Disorder.    [facility] Mental Health Outpatient Programs  TRACK: IOP 1    NUMBER OF PARTICIPANTS: 4        Data:    Session content: At the start of this group, patients were invited to check in by identifying themselves, describing their current emotional status, and identifying issues to address in this group.   Area(s) of treatment focus addressed in this session included Symptom Management and Personal Safety.  Pt reports feeling sad today. She is grieving the loss of her uncle. Pt is worried about her brother, who is her roommate too. Her brother has been drinking too much and sometimes is in risky situations. She is also upset about his girlfriend, who is manipulating him. Discussed ways to maintain boundaries, including attending [employer]. Pt identifies self-care as a coping skill. Denies safety concerns.    Therapeutic Interventions/Treatment Strategies:  Psychotherapist offered support, feedback and validation and reinforced use of skills. Treatment modalities used include Cognitive Behavioral Therapy. Interventions include Coping Skills: Discussed use of self-soothe skills to decrease distress in the body and Relationship Skills: Encouraged development and maintenance  of healthy boundaries.    Assessment:    Patient response:   Patient responded to session by accepting feedback, giving feedback, listening, focusing on goals, being attentive, and accepting support    Possible  barriers to participation / learning include: and no barriers identified    Health Issues:   None reported       Substance Use Review:   Substance Use: No active concerns identified.    Mental Status/Behavioral Observations  Appearance:   Appropriate   Eye Contact:   Good   Psychomotor Behavior: Normal   Attitude:   Cooperative   Orientation:   All  Speech   Rate / Production: Normal    Volume:  Normal   Mood:    Anxious  Depressed  Grieving  Affect:    Worrisome   Thought Content:   Clear  Thought Form:  Coherent  Logical     Insight:    Good     Plan:   Safety Plan: No current safety concerns identified.  Recommended that patient call 911 or go to the local ED should there be a change in any of these risk factors.   Barriers to treatment: None identified  Patient Contracts (see media tab):  None  Substance Use: Not addressed in session   Continue or Discharge: Patient will continue in Adult Day Treatment (ADT)  as planned. Patient is likely to benefit from learning and using skills as they work toward the goals identified in their treatment plan.      Katina Back, [facility] HospitalSW  January 9, 2024

## 2024-01-09 NOTE — GROUP NOTE
Psychotherapy Group Note    PATIENT'S NAME: Mariah Gaytan  MRN:   [id number]  :   1985  ACCT. NUMBER: [id number]  DATE OF SERVICE: 24  START TIME: 11:00 AM  END TIME: 11:50 AM  FACILITATOR: Bernice Mclain LICSW  TOPIC: MH EBP Group: Behavioral Activation  [facility] Mental Health Outpatient Programs  TRACK: IOP 1    NUMBER OF PARTICIPANTS: 4    Summary of Group / Topics Discussed:  Behavioral Activation: The Change Process - Behavior Change: Patients explored the process and types of change, including but not limited to, theories of change, steps to making change, methods of changing behavior, and potential barriers.  Patients worked to identify what changes may benefit their daily lives, and work towards a plan to implement change.      Patient Session Goals / Objectives:  Demonstrate understanding of the change process.    Identify positive and negative behavioral patterns.  Make plans to track and implement changes and share experiences in group.  Identify personal barriers to change      Patient Participation / Response:  Fully participated with the group by sharing personal reflections / insights and openly received / provided feedback with other participants.    Demonstrated understanding of topics discussed through group discussion and participation, Expressed understanding of the relationship between behaviors, thoughts, and feelings, Shared experiences and challenges with making behavioral changes, Identified barriers to change, Identified / Expressed personal readiness to make behavioral change, and Identified ways to increase goal directed activities    Treatment Plan:  Patient has an initial individualized treatment plan that was created as part of their diagnostic assessment / admission process.  A master individualized treatment plan is in the process of being developed with the patient and multi-disciplinary care team.    CATARINA Gallego

## 2024-01-10 ENCOUNTER — HOSPITAL ENCOUNTER (OUTPATIENT)
Dept: BEHAVIORAL HEALTH | Facility: CLINIC | Age: 39
Discharge: HOME | End: 2024-01-10
Attending: PSYCHIATRY & NEUROLOGY
Payer: COMMERCIAL

## 2024-01-10 PROCEDURE — 90853 GROUP PSYCHOTHERAPY: CPT

## 2024-01-10 NOTE — GROUP NOTE
Psychotherapy Group Note    PATIENT'S NAME: Mariah Gaytan  MRN:   [id number]  :   1985  [id number]  DATE OF SERVICE: 1/10/24  START TIME: 10:00 AM  END TIME: 10:50 AM  FACILITATOR: Katina Peraza LICSW  TOPIC:  EBP Group: Coping Skills  [facility] Mental Health Outpatient Programs  TRACK: IOP 1    NUMBER OF PARTICIPANTS: 4    Summary of Group / Topics Discussed:  Coping Skills: Improve the Moment: Patients learned to tolerate distress, by applying strategies to effect positive change in the present moment.  Patients will identified situations where they would benefit from applying strategies to improve the moment and reduce distress. Patients discussed how to distinguish when this can be useful in their lives or when other strategies would be more relevant or helpful.    Patient Session Goals / Objectives:  Discuss how the use of intentional  in the moment  actions can help reduce distress.  Review patients current practices and discuss a more formal way of practicing and accessing skills.  Increase ability to decide when to use improve the moment strategies  Choose 1-2 in the moment actions to apply during times of distress.      Patient Participation / Response:  Fully participated with the group by sharing personal reflections / insights and openly received / provided feedback with other participants.    Demonstrated understanding of topics discussed through group discussion and participation, Expressed understanding of the relevance / importance of coping skills at distressing times in life, Demonstrated knowledge of when to consider using a variety of coping skills in daily life, and Identified barriers to applying coping skills    Treatment Plan:  Patient has a current master individualized treatment plan.  See Epic treatment plan for more information.    CATARINA Chandler

## 2024-01-10 NOTE — GROUP NOTE
Process Group Note    PATIENT'S NAME: Mariah Gaytan  MRN:   [id number]  :   1985  ACCT. NUMBER: [id number]  DATE OF SERVICE: 1/10/24  START TIME:  9:00 AM  END TIME:  9:50 AM  FACILITATOR: Katina Peraza LICSW  TOPIC:  Process Group    Diagnoses:  Principal DSM5 Diagnoses  (Sustained by DSM5 Criteria Listed Above):   296.32 (F33.1) Major Depressive Disorder, Recurrent Episode, Moderate _ and With melancholic features.  3. Other Diagnoses that is relevant to services:   300.02 (F41.1) Generalized Anxiety Disorder.    [facility] Mental Health Outpatient Programs  TRACK: IOP 1    NUMBER OF PARTICIPANTS: 4        Data:    Session content: At the start of this group, patients were invited to check in by identifying themselves, describing their current emotional status, and identifying issues to address in this group.   Area(s) of treatment focus addressed in this session included Symptom Management and Personal Safety.  Pt reports feeling positive about productivity last night. She worked on her [place] paperwork and scheduled a car appointment. She listened to a meditation last night before bed and initially it relaxed her, but then she became activated. Pt will continue to use it. She denies safety concerns.    Therapeutic Interventions/Treatment Strategies:  Psychotherapist offered support, feedback and validation and reinforced use of skills. Treatment modalities used include Cognitive Behavioral Therapy. Interventions include Behavioral Activation: Explored how behaviors effect mood and interact with thoughts and feelings and Coping Skills: Discussed use of self-soothe skills to decrease distress in the body.    Assessment:    Patient response:   Patient responded to session by accepting feedback, giving feedback, listening, focusing on goals, being attentive, and accepting support    Possible barriers to participation / learning include: and no barriers identified    Health Issues:   None  reported       Substance Use Review:   Substance Use: No active concerns identified.    Mental Status/Behavioral Observations  Appearance:   Appropriate   Eye Contact:   Good   Psychomotor Behavior: Normal   Attitude:   Cooperative   Orientation:   All  Speech   Rate / Production: Normal    Volume:  Normal   Mood:    Anxious  Normal  Affect:    Appropriate   Thought Content:   Clear  Thought Form:  Coherent  Logical     Insight:    Good     Plan:   Safety Plan: No current safety concerns identified.  Recommended that patient call 911 or go to the local ED should there be a change in any of these risk factors.   Barriers to treatment: None identified  Patient Contracts (see media tab):  None  Substance Use: Not addressed in session   Continue or Discharge: Patient will continue in Adult Day Treatment (ADT)  as planned. Patient is likely to benefit from learning and using skills as they work toward the goals identified in their treatment plan.      Katina Back, LICSW  January 10, 2024

## 2024-01-11 ENCOUNTER — HOSPITAL ENCOUNTER (OUTPATIENT)
Dept: BEHAVIORAL HEALTH | Facility: CLINIC | Age: 39
Discharge: HOME | End: 2024-01-11
Attending: PSYCHIATRY & NEUROLOGY
Payer: COMMERCIAL

## 2024-01-11 PROCEDURE — 90853 GROUP PSYCHOTHERAPY: CPT

## 2024-01-11 NOTE — GROUP NOTE
Psychotherapy Group Note    PATIENT'S NAME: Mariah Gaytan  MRN:   [id number]  :   1985  ACCT. NUMBER: [id number]  DATE OF SERVICE: 24  START TIME: 10:00 AM  END TIME: 10:50 AM  FACILITATOR: Katina Peraza LICSW  TOPIC: MH EBP Group: Coping Skills  [facility] Health Outpatient [facility]  TRACK: IOP 1    NUMBER OF PARTICIPANTS: 4    Summary of Group / Topics Discussed:  Coping Skills: Additional Coping Skills/GLADLY GO exercise:  Patients discussed the mental health benefits of acknowledging accomplishments and identifying areas of gratitude.  Reviewed the benefits of applying the aforementioned coping strategies.  Patients explored how these strategies might be applied to daily stressors or distressing situations.    Patient Session Goals / Objectives:  Understand the purpose and benefits of applying GLADLY GO coping strategies  Identify weekly accomplishments  Identify areas of gratitude  Address barriers to utilizing coping skills when in distress.      Patient Participation / Response:  Fully participated with the group by sharing personal reflections / insights and openly received / provided feedback with other participants.    Demonstrated understanding of topics discussed through group discussion and participation, Expressed understanding of the relevance / importance of coping skills at distressing times in life, and Demonstrated knowledge of when to consider using a variety of coping skills in daily life    Treatment Plan:  Patient has a current master individualized treatment plan.  See Epic treatment plan for more information.    CATARINA Chandler

## 2024-01-11 NOTE — GROUP NOTE
Process Group Note    PATIENT'S NAME: Mariah Gaytan  MRN:   [id number]  :   1985  ACCT. NUMBER: [id number]  DATE OF SERVICE: 24  START TIME:  9:00 AM  END TIME:  9:50 AM  FACILITATOR: Katina Peraza LICSW  TOPIC:  Process Group    Diagnoses:  Principal DSM5 Diagnoses  (Sustained by DSM5 Criteria Listed Above):   296.32 (F33.1) Major Depressive Disorder, Recurrent Episode, Moderate _ and With melancholic features.  3. Other Diagnoses that is relevant to services:   300.02 (F41.1) Generalized Anxiety Disorder.    [facility] Mental [facility] Outpatient Programs  TRACK: IOP 1    NUMBER OF PARTICIPANTS: 5        Data:    Session content: At the start of this group, patients were invited to check in by identifying themselves, describing their current emotional status, and identifying issues to address in this group.   Area(s) of treatment focus addressed in this session included Symptom Management, Personal Safety, and Community Resources/Discharge Planning.  Pt reports feeling anxious. She feels pressure to look at career and is certain she does not want to return to current stressful job. Pt will reach out to her career counselor. She also will reach out to her friends. Denies safety concerns.    Therapeutic Interventions/Treatment Strategies:  Psychotherapist offered support, feedback and validation and reinforced use of skills. Treatment modalities used include Cognitive Behavioral Therapy. Interventions include Behavioral Activation: Explored how behaviors effect mood and interact with thoughts and feelings, Coping Skills: Discussed use of self-soothe skills to decrease distress in the body and Assisted patient in understanding the purpose of planning / creating / participating / sharing in positive experiences, and Other: Discussed ways to increase hopefulness.    Assessment:    Patient response:   Patient responded to session by accepting feedback, giving feedback, listening, focusing on  goals, being attentive, and accepting support    Possible barriers to participation / learning include: and no barriers identified    Health Issues:   None reported       Substance Use Review:   Substance Use: No active concerns identified.    Mental Status/Behavioral Observations  Appearance:   Appropriate   Eye Contact:   Good   Psychomotor Behavior: Normal   Attitude:   Cooperative   Orientation:   All  Speech   Rate / Production: Normal    Volume:  Normal   Mood:    Anxious  Depressed  Grieving Fearful  Affect:    Worrisome   Thought Content:   Rumination  Thought Form:  Coherent     Insight:    Good     Plan:   Safety Plan: No current safety concerns identified.  Recommended that patient call 911 or go to the local ED should there be a change in any of these risk factors.   Barriers to treatment: None identified  Patient Contracts (see media tab):  None  Substance Use: Not addressed in session   Continue or Discharge: Patient will continue in Adult Day Treatment (ADT)  as planned. Patient is likely to benefit from learning and using skills as they work toward the goals identified in their treatment plan.      Katina Back, CATARINA  January 11, 2024

## 2024-01-11 NOTE — GROUP NOTE
Psychoeducation Group Note    PATIENT'S NAME: Mariah Gaytan  MRN:   [id number]  :   1985  ACCT. NUMBER: [id number]  DATE OF SERVICE: 24  START TIME: 11:00 AM  END TIME: 11:50 AM  FACILITATOR: Bernice Mclain LICSW  TOPIC: MH Wellness Group: Health Maintenance  [facility] Adult Mental Health Outpatient Programs  TRACK: IOP 1    NUMBER OF PARTICIPANTS: 6    Summary of Group / Topics Discussed:  Health Maintenance: Weekend planning: Patients were given time to complete a weekend plan of what they will do to promote wellness and sobriety over the weekend when they do not have the structure of group. Patients were encouraged to review progress on their treatment goals and were challenged to identify ways to work toward meeting them. Patients identified and discussed foreseeable barriers to success over the weekend and then developed a plan to overcome them. Patients reviewed their distress coping skills and social support network and discussed this with the group.       Patient Session Goals / Objectives:    ?    Identified activities to engage in that promote balance in wellness  ?    Distinguished possible barriers to success over the weekend and created a plan to overcome them  ?    Listed distress coping skills and identified social support network to utilize if in crisis during the weekend        Patient Participation / Response:  Fully participated with the group by sharing personal reflections / insights and openly received / provided feedback with other participants.    Demonstrated understanding of topics discussed through group discussion and participation, Identified / Expressed personal readiness to practice skills, and Verbalized understanding of health maintenance topic    Treatment Plan:  Patient has a current master individualized treatment plan.  See Epic treatment plan for more information.    CATARINA Gallego

## 2024-01-15 ENCOUNTER — HOSPITAL ENCOUNTER (OUTPATIENT)
Dept: BEHAVIORAL HEALTH | Facility: CLINIC | Age: 39
Discharge: HOME | End: 2024-01-15
Attending: PSYCHIATRY & NEUROLOGY
Payer: COMMERCIAL

## 2024-01-15 PROCEDURE — 90853 GROUP PSYCHOTHERAPY: CPT

## 2024-01-15 PROCEDURE — 90853 GROUP PSYCHOTHERAPY: CPT | Performed by: COUNSELOR

## 2024-01-15 NOTE — GROUP NOTE
"Process Group Note    PATIENT'S NAME: Mariah Gaytan  MRN:   [id number]  :   1985  ACCT. NUMBER: [id number]  DATE OF SERVICE: 1/15/24  START TIME:  9:00 AM  END TIME:  9:50 AM  FACILITATOR: Katina Peraza LICSW  TOPIC:  Process Group    Diagnoses:  Principal DSM5 Diagnoses  (Sustained by DSM5 Criteria Listed Above):   296.32 (F33.1) Major Depressive Disorder, Recurrent Episode, Moderate _ and With melancholic features.  3. Other Diagnoses that is relevant to services:   300.02 (F41.1) Generalized Anxiety Disorder.    [facility] Outpatient Programs  TRACK: IOP 1    NUMBER OF PARTICIPANTS: 7        Data:    Session content: At the start of this group, patients were invited to check in by identifying themselves, describing their current emotional status, and identifying issues to address in this group.   Area(s) of treatment focus addressed in this session included Symptom Management and Personal Safety.  Pt reports feeling \"cranky\" today. She identifies a \"good and bad' weekend. She felt lonely at times, especially since most of her friends were busy. Pt was able to enjoy time with one friend. She reports stress of dealing with car repairs. She is also stressed by noisy neighbor. Pt makes a plan to talk to both the  and the neighbor. Pt identifies coping skills as yoga and mindfulness. She is grateful for her warm home and denies safety concerns.    Therapeutic Interventions/Treatment Strategies:  Psychotherapist offered support, feedback and validation and reinforced use of skills. Treatment modalities used include Cognitive Behavioral Therapy. Interventions include Coping Skills: Discussed use of self-soothe skills to decrease distress in the body and Assisted patient in understanding the purpose of planning / creating / participating / sharing in positive experiences and Relationship Skills: Assisted patients in implementing more effective communication skills in " their relationships and Encouraged development and maintenance  of healthy boundaries.    Assessment:    Patient response:   Patient responded to session by accepting feedback, giving feedback, listening, focusing on goals, being attentive, and accepting support    Possible barriers to participation / learning include: and no barriers identified    Health Issues:   None reported       Substance Use Review:   Substance Use: No active concerns identified.    Mental Status/Behavioral Observations  Appearance:   Appropriate   Eye Contact:   Good   Psychomotor Behavior: Normal   Attitude:   Cooperative   Orientation:   All  Speech   Rate / Production: Normal    Volume:  Normal   Mood:    Anxious  Depressed  Irritable   Affect:    Worrisome   Thought Content:   Rumination  Thought Form:  Coherent     Insight:    Good     Plan:   Safety Plan: No current safety concerns identified.  Recommended that patient call 911 or go to the local ED should there be a change in any of these risk factors.   Barriers to treatment: None identified  Patient Contracts (see media tab):  None  Substance Use: Not addressed in session   Continue or Discharge: Patient will continue in Adult Day Treatment (ADT)  as planned. Patient is likely to benefit from learning and using skills as they work toward the goals identified in their treatment plan.      Katina Back, LICSW  January 15, 2024

## 2024-01-15 NOTE — GROUP NOTE
Psychoeducation Group Note    PATIENT'S NAME: Mariah Gaytan  MRN:   [id number]  :   1985  ACCT. NUMBER: [id number]  DATE OF SERVICE: 1/15/24  START TIME: 10:00 AM  END TIME: 10:50 AM  FACILITATOR: Katina Peraza LICSW  TOPIC:  Wellness Group: [facility] Adult Mental Health Outpatient Programs  TRACK: IOP 1    NUMBER OF PARTICIPANTS: 6    Summary of Group / Topics Discussed:  Foundations of Health: Exercise: Why exercise: Patients evaluated and discussed their current exercise behaviors/physical activity routine and identified barriers/obstacles to meeting daily physical activity recommendations. Patients were educated on the four types of exercise: endurance, strength, balance, and flexibility. Patients were educated on the benefits of getting daily physical activity, safety tips for beginning an exercise program, and fitness goal setting strategies.     Patient Session Goals / Objectives:  Explained the emotional and physical benefits of exercise  Identified how exercise and activity affects bodily function  Listed ways to incorporate exercise into daily routine      Patient Participation / Response:  Fully participated with the group by sharing personal reflections / insights and openly received / provided feedback with other participants.    Demonstrated understanding of topics discussed through group discussion and participation, Identified / Expressed personal readiness to practice skills, and Verbalized understanding of foundations of health topic    Treatment Plan:  Patient has a current master individualized treatment plan.  See Epic treatment plan for more information.    CATARINA Chandler

## 2024-01-16 ENCOUNTER — HOSPITAL ENCOUNTER (OUTPATIENT)
Dept: BEHAVIORAL HEALTH | Facility: CLINIC | Age: 39
Discharge: HOME | End: 2024-01-16
Attending: PSYCHIATRY & NEUROLOGY
Payer: COMMERCIAL

## 2024-01-16 PROCEDURE — 90853 GROUP PSYCHOTHERAPY: CPT

## 2024-01-16 NOTE — GROUP NOTE
Psychotherapy Group Note    PATIENT'S NAME: Mariah Gaytan  MRN:   [id number]  :   1985  ACCT. NUMBER: [id number]  DATE OF SERVICE: 1/15/24  START TIME: 11:00 AM  END TIME: 11:50 AM  FACILITATOR: Palma Kendrick LPCC  TOPIC:  EBP Group: [facility] Hospital[facility]  TRACK: 1    NUMBER OF PARTICIPANTS: 7    Summary of Group / Topics Discussed:  Mindfulness: Mindfulness Skills: Patients received information on the main components of mindfulness. Patients participated in discussion on how to practice observing, describing, and participating in internal and external environments. Relevance of mindfulness skills to overall mental and physical health was explored.  Patients explored and discussed in group their current awareness and knowledge of mindfulness skills as well as barriers to applying skills.    Patient Session Goals / Objectives:  Demonstrated and verbalized understanding of key mindfulness concepts  Identified when/how to use mindfulness skills  Resolved barriers to practicing mindfulness skills  Identified plan to use mindfulness skills in daily life       Patient Participation / Response:  Fully participated with the group by sharing personal reflections / insights and openly received / provided feedback with other participants.    Demonstrated understanding of topics discussed through group discussion and participation, Demonstrated understanding of mindfulness skills and benefits of practice, Verbalized understanding of how mindfulness can benefit mental health symptoms, and Practiced skills in session    Treatment Plan:  Patient has a current master individualized treatment plan.  See Epic treatment plan for more information.    SHANTELL Mcgrath

## 2024-01-16 NOTE — GROUP NOTE
"Process Group Note    PATIENT'S NAME: Mariah Gaytan  MRN:   [id number]  :   1985  ACCT. NUMBER: [id number]  DATE OF SERVICE: 24  START TIME:  9:00 AM  END TIME:  9:50 AM  FACILITATOR: Katina Peraza LICSW  TOPIC:  Process Group    Diagnoses:  Principal DSM5 Diagnoses  (Sustained by DSM5 Criteria Listed Above):   296.32 (F33.1) Major Depressive Disorder, Recurrent Episode, Moderate _ and With melancholic features.  3. Other Diagnoses that is relevant to services:   300.02 (F41.1) Generalized Anxiety Disorder.    [facility] Outpatient Programs  TRACK: IOP 1    NUMBER OF PARTICIPANTS: 6        Data:    Session content: At the start of this group, patients were invited to check in by identifying themselves, describing their current emotional status, and identifying issues to address in this group.   Area(s) of treatment focus addressed in this session included Symptom Management and Personal Safety.  Pt reports feeling \"anxious and cranky\" today. She reports frustration with upstairs neighbors who continue to be loud. She is also irritated that her warning light came on in her car, after dealing with the  this past week. Pt makes a plan to call the  and also check into her 401K plan. She is grateful for face time conversation with friend last night and is proud of going to yoga yesterday. Denies safety concerns.    Therapeutic Interventions/Treatment Strategies:  Psychotherapist offered support, feedback and validation and reinforced use of skills. Treatment modalities used include Cognitive Behavioral Therapy. Interventions include Coping Skills: Discussed use of self-soothe skills to decrease distress in the body, Other: Discussed ways to increase hopefulness, and Relationship Skills: Assisted patients in implementing more effective communication skills in their relationships.    Assessment:    Patient response:   Patient responded to session by accepting " feedback, giving feedback, listening, focusing on goals, being attentive, and accepting support    Possible barriers to participation / learning include: and no barriers identified    Health Issues:   None reported       Substance Use Review:   Substance Use: No active concerns identified.    Mental Status/Behavioral Observations  Appearance:   Appropriate   Eye Contact:   Good   Psychomotor Behavior: Normal   Attitude:   Cooperative   Orientation:   All  Speech   Rate / Production: Normal    Volume:  Normal   Mood:    Anxious  Depressed  Irritable   Affect:    Worrisome   Thought Content:   Rumination  Thought Form:  Coherent  Logical     Insight:    Good     Plan:   Safety Plan: No current safety concerns identified.  Recommended that patient call 911 or go to the local ED should there be a change in any of these risk factors.   Barriers to treatment: None identified  Patient Contracts (see media tab):  None  Substance Use: Not addressed in session   Continue or Discharge: Patient will continue in Adult Day Treatment (ADT)  as planned. Patient is likely to benefit from learning and using skills as they work toward the goals identified in their treatment plan.      Katina Back, [facility]  January 16, 2024

## 2024-01-16 NOTE — GROUP NOTE
Psychotherapy Group Note    PATIENT'S NAME: Mariah Gaytan  MRN:   [id number]  :   1985  ACCT. NUMBER: [id number]  DATE OF SERVICE: 24  START TIME: 10:00 AM  END TIME: 10:50 AM  FACILITATOR: Katina Peraza LICSW  TOPIC: MH EBP Group: Self-Awareness  [facility] Outpatient Programs  TRACK: IOP 1    NUMBER OF PARTICIPANTS: 7    Summary of Group / Topics Discussed:  Self-Awareness: Grief: Patients were provided with an overview of how personal losses impact their thoughts, feelings, and behaviors. Different stages of grief were discussed, with a focus on the personal and individual experiences of grief as a natural response to loss. The relationship between grief, depression, and anxiety was also discussed. Patients were provided with information regarding different ways of processing grief and shared their personal experiences.     Patient Session Goals / Objectives:  Defined and explored the concept of grief and the grieving process  Discussed relationship between grief, depression, and anxiety   Normalized and recognized the purpose/benefits of the grieving process  Discussed management of the thoughts and feelings associated with grief      Patient Participation / Response:  Fully participated with the group by sharing personal reflections / insights and openly received / provided feedback with other participants.    Demonstrated understanding of topics discussed through group discussion and participation    Treatment Plan:  Patient has a current master individualized treatment plan.  See Epic treatment plan for more information.    CATARINA Chandler

## 2024-01-16 NOTE — GROUP NOTE
Psychotherapy Group Note    PATIENT'S NAME: Mariah Gaytan  MRN:   [id number]  :   1985  ACCT. NUMBER: [id number]  DATE OF SERVICE: 24  START TIME: 11:00 AM  END TIME: 11:50 AM  FACILITATOR: Bernice Mclain LICSW  TOPIC:  EBP Group: Self-Awareness  [facility] Mental [facility] Outpatient Programs  TRACK: IOP 1    NUMBER OF PARTICIPANTS: 6    Summary of Group / Topics Discussed:  Self-Awareness: Personal Strengths: Topic focused on assisting patients in identifying personal strengths and how they relate to the management of mental health symptoms. Patients discussed the benefits of acknowledging their personal strengths and their impact on mood improvement, mindfulness, and perspective. Patients worked to increase time spent on recognition and appreciation of what is positive and working in their lives. The goal is to reduce rumination and negative thinking resulting in increased mindfulness and resilience. Patients will work to put skills into practice and problem-solve barriers.     Patient Session Goals / Objectives:  Identified personal strengths  Identified barriers to recognition of personal strengths  Verbalized understanding of strategies to increase use of their strengths in management of daily symptoms      Patient Participation / Response:  Fully participated with the group by sharing personal reflections / insights and openly received / provided feedback with other participants.    Demonstrated understanding of topics discussed through group discussion and participation, Demonstrated understanding of values, strengths, and challenges to learn about themselves, and Practiced skills in session    Treatment Plan:  Patient has a current master individualized treatment plan.  See Epic treatment plan for more information.    CATARINA Gallego

## 2024-01-17 ENCOUNTER — HOSPITAL ENCOUNTER (OUTPATIENT)
Dept: BEHAVIORAL HEALTH | Facility: CLINIC | Age: 39
Discharge: HOME | End: 2024-01-17
Attending: PSYCHIATRY & NEUROLOGY
Payer: COMMERCIAL

## 2024-01-17 PROCEDURE — 90853 GROUP PSYCHOTHERAPY: CPT

## 2024-01-17 NOTE — GROUP NOTE
Process Group Note    PATIENT'S NAME: Mariah Gaytan  MRN:   [id number]  :   1985  ACCT. NUMBER: [id number]  DATE OF SERVICE: 24  START TIME:  9:00 AM  END TIME:  9:50 AM  FACILITATOR: Katina Peraza LICSW  TOPIC:  Process Group    Diagnoses:  Principal DSM5 Diagnoses  (Sustained by DSM5 Criteria Listed Above):   296.32 (F33.1) Major Depressive Disorder, Recurrent Episode, Moderate _ and With melancholic features.  3. Other Diagnoses that is relevant to services:   300.02 (F41.1) Generalized Anxiety Disorder.    [facility] Mental Health Outpatient Programs  TRACK: IOP 1    NUMBER OF PARTICIPANTS: 6        Data:    Session content: At the start of this group, patients were invited to check in by identifying themselves, describing their current emotional status, and identifying issues to address in this group.   Area(s) of treatment focus addressed in this session included Symptom Management and Personal Safety.  Pt reports feeling anxious today. She reports poor sleep. Pt reports frustration with long phone call with 401K company. Today she sets a goal to call her . Pt skipped yoga yesterday, due to stomach pain, but plans to go today. Denies safety concerns.    Therapeutic Interventions/Treatment Strategies:  Psychotherapist offered support, feedback and validation and reinforced use of skills. Treatment modalities used include Cognitive Behavioral Therapy. Interventions include Behavioral Activation: Explored how behaviors effect mood and interact with thoughts and feelings and Coping Skills: Discussed use of self-soothe skills to decrease distress in the body.    Assessment:    Patient response:   Patient responded to session by accepting feedback, giving feedback, listening, focusing on goals, being attentive, and accepting support    Possible barriers to participation / learning include: and no barriers identified    Health Issues:   None reported       Substance Use  Review:   Substance Use: No active concerns identified.    Mental Status/Behavioral Observations  Appearance:   Appropriate   Eye Contact:   Good   Psychomotor Behavior: Retarded (Slowed)   Attitude:   Cooperative   Orientation:   All  Speech   Rate / Production: Normal    Volume:  Normal   Mood:    Anxious  Depressed   Affect:    Flat  Worrisome   Thought Content:   Rumination  Thought Form:  Coherent  Logical     Insight:    Good     Plan:   Safety Plan: No current safety concerns identified.  Recommended that patient call 911 or go to the local ED should there be a change in any of these risk factors.   Barriers to treatment: None identified  Patient Contracts (see media tab):  None  Substance Use: Not addressed in session   Continue or Discharge: Patient will continue in Adult Day Treatment (ADT)  as planned. Patient is likely to benefit from learning and using skills as they work toward the goals identified in their treatment plan.      Katina Back, LICSW  January 17, 2024

## 2024-01-17 NOTE — GROUP NOTE
Psychotherapy Group Note    PATIENT'S NAME: Mariah Gaytan  MRN:   [id number]  :   1985  [id number]  DATE OF SERVICE: 24  START TIME: 10:00 AM  END TIME: 10:50 AM  FACILITATOR: Katina Peraza LICSW; Maira Esposito OTR  TOPIC:  EBP Group: Coping Skills  [facility] Outpatient [facility]  TRACK: IOP 1    NUMBER OF PARTICIPANTS: 7      Resiliency Development: Coping Skills: Provided education on the benefits of exploring and identifying helpful coping skills to help manage distress and regulate emotions.  Discussed the importance of having a coping skills plan in times of increased symptoms.  Patients were given an opportunity to explore different types of coping skills (distraction, positive/reward, relaxation, mindfulness/grounding, safe ways to express feelings).  Patients self-reflected on coping skills that they could engage in their daily life and add into their daily routine.    Patient Session Goals / Objectives:   Review the benefits of having different types of coping skills to help manage distress and regulate emotions.   Explore different types of coping skills to promote self-regulation and independent skill use.   Established a plan for practice of these skills in their own environments.       Patient Participation / Response:  Fully participated with the group by sharing personal reflections / insights and openly received / provided feedback with other participants.    Demonstrated understanding of topics discussed through group discussion and participation, Expressed understanding of the relevance / importance of coping skills at distressing times in life, and Demonstrated knowledge of when to consider using a variety of coping skills in daily life    Treatment Plan:  Patient has a current master individualized treatment plan.  See Epic treatment plan for more information.    CATARINA Chandler

## 2024-01-17 NOTE — GROUP NOTE
Psychoeducation Group Note    PATIENT'S NAME: Mariah Gaytan  MRN:   [id number]  :   1985  ACCT. NUMBER: [id number]  DATE OF SERVICE: 24  START TIME: 11:00 AM  END TIME: 11:50 AM  FACILITATOR: Bernice Mclain LICSW  TOPIC: MH Wellness Group: Health Maintenance  [facility] Outpatient Programs  TRACK: IOP 1    NUMBER OF PARTICIPANTS: 6    Summary of Group / Topics Discussed:  Health Maintenance: Eight Dimensions of Wellness: The concept of holistic health through the model of eight dimensions was introduced. Group members participated in identifying behaviors and activities in each of the dimensions of wellness.  The importance of each dimension was reinforced and the concept of balance in life as it relates to wellness was explored.      Patient Session Goals / Objectives:  Verbalized understanding of balance in wellness and how it relates to their life  Identified and explained the eight dimensions of wellness  Categorized activities and wellness needs into corresponding dimensions appropriately during exercise        Patient Participation / Response:  Moderately participated, sharing some personal reflections / insights and adequately adequately received / provided feedback with other participants.    Demonstrated understanding of topics discussed through group discussion and participation, Identified / Expressed personal readiness to practice skills, and Verbalized understanding of health maintenance topic    Treatment Plan:  Patient has a current master individualized treatment plan.  See Epic treatment plan for more information.    CATARINA Gallego

## 2024-01-18 ENCOUNTER — HOSPITAL ENCOUNTER (OUTPATIENT)
Dept: BEHAVIORAL HEALTH | Facility: CLINIC | Age: 39
Discharge: HOME | End: 2024-01-18
Attending: PSYCHIATRY & NEUROLOGY
Payer: COMMERCIAL

## 2024-01-18 PROCEDURE — 90853 GROUP PSYCHOTHERAPY: CPT

## 2024-01-18 NOTE — GROUP NOTE
Psychoeducation Group Note    PATIENT'S NAME: Mariah Gaytan  MRN:   [id number]  :   1985  ACCT. NUMBER: [id number]  DATE OF SERVICE: 24  START TIME: 11:00 AM  END TIME: 11:50 AM  FACILITATOR: Bernice Mclain LICSW  TOPIC: MH Wellness Group: Health [facility] Adult Mental Health Outpatient Programs  TRACK: IOP 1    NUMBER OF PARTICIPANTS: 7    Summary of Group / Topics Discussed:  Health Maintenance: Weekend planning: Patients were given time to complete a weekend plan of what they will do to promote wellness and sobriety over the weekend when they do not have the structure of group. Patients were encouraged to review progress on their treatment goals and were challenged to identify ways to work toward meeting them. Patients identified and discussed foreseeable barriers to success over the weekend and then developed a plan to overcome them. Patients reviewed their distress coping skills and social support network and discussed this with the group.       Patient Session Goals / Objectives:    ?    Identified activities to engage in that promote balance in wellness  ?    Distinguished possible barriers to success over the weekend and created a plan to overcome them  ?    Listed distress coping skills and identified social support network to utilize if in crisis during the weekend        Patient Participation / Response:  Minimally participated, only when prompted / asked.    Demonstrated understanding of topics discussed through group discussion and participation    Treatment Plan:  Patient has a current master individualized treatment plan.  See Epic treatment plan for more information.    CATARINA Gallego

## 2024-01-18 NOTE — GROUP NOTE
"Process Group Note    PATIENT'S NAME: Mariah Gaytan  MRN:   [id number]  :   1985  ACCT. NUMBER: [id number]  DATE OF SERVICE: 24  START TIME:  9:00 AM  END TIME:  9:50 AM  FACILITATOR: Katina Peraza LICSW  TOPIC:  Process Group    Diagnoses:  Principal DSM5 Diagnoses  (Sustained by DSM5 Criteria Listed Above):   296.32 (F33.1) Major Depressive Disorder, Recurrent Episode, Moderate _ and With melancholic features.  3. Other Diagnoses that is relevant to services:   300.02 (F41.1) Generalized Anxiety Disorder.    [facility] Mental Health Outpatient Programs  TRACK: IOP 1    NUMBER OF PARTICIPANTS: 8        Data:    Session content: At the start of this group, patients were invited to check in by identifying themselves, describing their current emotional status, and identifying issues to address in this group.   Area(s) of treatment focus addressed in this session included Symptom Management and Personal Safety.  Pt reports feeling \"ok, maybe a little better\". She reports feeling good about getting up a little earlier and making it here on time. She enjoyed a long nap yesterday and is proud of going to yoga afterwards. Pt identifies plan to call the . She also plans to call her insurance to see if there is coverage for a gym membership. She is grateful for a warm house. Denies safety concerns.    Therapeutic Interventions/Treatment Strategies:  Psychotherapist offered support, feedback and validation and reinforced use of skills. Treatment modalities used include Cognitive Behavioral Therapy. Interventions include Behavioral Activation: Explored how behaviors effect mood and interact with thoughts and feelings and Coping Skills: Discussed use of self-soothe skills to decrease distress in the body and Assisted patient in understanding the purpose of planning / creating / participating / sharing in positive experiences.    Assessment:    Patient response:   Patient responded to " session by listening, focusing on goals, but less engaged than usual    Possible barriers to participation / learning include: and no barriers identified    Health Issues:   None reported       Substance Use Review:   Substance Use: No active concerns identified.    Mental Status/Behavioral Observations  Appearance:   Appropriate   Eye Contact:   Good   Psychomotor Behavior: Normal   Attitude:   Cooperative   Orientation:   All  Speech   Rate / Production: Normal    Volume:  Normal   Mood:    Anxious  Depressed   Affect:    Flat  Worrisome   Thought Content:   Rumination  Thought Form:  Coherent     Insight:    Good     Plan:   Safety Plan: No current safety concerns identified.  Recommended that patient call 911 or go to the local ED should there be a change in any of these risk factors.   Barriers to treatment: None identified  Patient Contracts (see media tab):  None  Substance Use: Not addressed in session   Continue or Discharge: Patient will continue in Adult Day Treatment (ADT)  as planned. Patient is likely to benefit from learning and using skills as they work toward the goals identified in their treatment plan.      Katina Back, [facility]  January 18, 2024

## 2024-01-18 NOTE — GROUP NOTE
Psychotherapy Group Note    PATIENT'S NAME: Mariah Gaytan  MRN:   [id number]  :   1985  ACCT. NUMBER: [id number]  DATE OF SERVICE: 24  START TIME: 10:00 AM  END TIME: 10:50 AM  FACILITATOR: Katina Peraza LICSW  TOPIC:  EBP Group: Emotions Management  [facility] Mental Health Outpatient Programs  TRACK: IOP 1    NUMBER OF PARTICIPANTS: 7    Summary of Group / Topics Discussed:  Emotions Management: Anger: Patients explored and shared personal experiences associated with feelings of anger.  Group explored how these feelings develop, what they mean to each individual, and how to increase acceptance and usefulness of these feelings.  Discussed anger as a  secondary  emotion and reviewed ways to manage anger and challenge associated cognitive distortions. Group members worked to contextualize these concepts and promote healing.     Patient Session Goals / Objectives:  Discuss and review definitions and personal views/experiences with anger  Explore how feelings of anger impact functioning  Understand and practice strategies to manage difficult emotions and move towards healing  Demonstrate understanding of the feelings of anger  Verbalize how these emotions have impacted their lives/functioning  Verbalize of knowledge gained and possible interventions to manage feelings      Patient Participation / Response:  Moderately participated, sharing some personal reflections / insights and adequately adequately received / provided feedback with other participants.    Demonstrated understanding of topics discussed through group discussion and participation    Treatment Plan:  Patient has a current master individualized treatment plan.  See Epic treatment plan for more information.    CATARINA Chandler

## 2024-01-22 ENCOUNTER — HOSPITAL ENCOUNTER (OUTPATIENT)
Dept: BEHAVIORAL HEALTH | Facility: CLINIC | Age: 39
Discharge: HOME | End: 2024-01-22
Attending: PSYCHIATRY & NEUROLOGY
Payer: COMMERCIAL

## 2024-01-22 PROCEDURE — 90853 GROUP PSYCHOTHERAPY: CPT

## 2024-01-22 NOTE — GROUP NOTE
Psychoeducation Group Note    PATIENT'S NAME: Mariah Gaytan  MRN:   [id number]  :   1985  ACCT. NUMBER: [id number]  DATE OF SERVICE: 24  START TIME: 11:00 AM  END TIME: 11:50 AM  FACILITATOR: Bernice Mclain LICSW  TOPIC: MH Wellness Group: Mental Health Maintenance  [facility] Mental Health Outpatient Programs  TRACK: IOP 1    NUMBER OF PARTICIPANTS: 6    Summary of Group / Topics Discussed:  Mental Health Maintenance:  Vulnerability: In this group, the concept of vulnerability was explored through the viewing, discussion, and self-reflection of the Tobi Colón Talk Titled,  The Power of Vulnerability.      Patient Session Goals / Objectives:  Defined and described definition of vulnerability   Identified 2 or more ways of practicing authenticity       Patient Participation / Response:  Moderately participated, sharing some personal reflections / insights and adequately adequately received / provided feedback with other participants.    Demonstrated understanding of topics discussed through group discussion and participation, Identified / Expressed personal readiness to practice skills, and Verbalized understanding of mental health maintenance topic    Treatment Plan:  Patient has a current master individualized treatment plan.  See Epic treatment plan for more information.    CATARINA Gallego

## 2024-01-22 NOTE — GROUP NOTE
Psychotherapy Group Note    PATIENT'S NAME: Mariah Gaytan  MRN:   [id number]  :   1985  ACCT. NUMBER: [id number]  DATE OF SERVICE: 24  START TIME: 10:00 AM  END TIME: 10:50 AM  FACILITATOR: Katina Peraza LICSW  TOPIC: MH EBP Group: Coping Skills  [facility] Adult Mental Health Outpatient Programs  TRACK: IOP 1    NUMBER OF PARTICIPANTS: 6    Summary of Group / Topics Discussed:  Coping Skills: Additional Coping Skills/Nature:  Patients discussed the mental health benefits of spending time in nature.  Reviewed the benefits of applying the aforementioned coping strategies.  Patients explored how these strategies might be applied to daily stressors or distressing situations.    Patient Session Goals / Objectives:  Understand the purpose and benefits of nature   Learn about nature connection activities  Identify ways to spend time outdoors  Address barriers to utilizing coping skills when in distress.      Patient Participation / Response:  Fully participated with the group by sharing personal reflections / insights and openly received / provided feedback with other participants.    Demonstrated understanding of topics discussed through group discussion and participation and Expressed understanding of the relevance / importance of coping skills at distressing times in life    Treatment Plan:  Patient has a current master individualized treatment plan.  See Epic treatment plan for more information.    CATARINA Chandler

## 2024-01-22 NOTE — GROUP NOTE
Process Group Note    PATIENT'S NAME: Mariah Gaytan  MRN:   [id number]  :   1985  ACCT. NUMBER: [id number]  DATE OF SERVICE: 24  START TIME:  9:00 AM  END TIME:  9:50 AM  FACILITATOR: Katina Peraza LICSW  TOPIC:  Process Group    Diagnoses:  Principal DSM5 Diagnoses  (Sustained by DSM5 Criteria Listed Above):   296.32 (F33.1) Major Depressive Disorder, Recurrent Episode, Moderate _ and With melancholic features.  3. Other Diagnoses that is relevant to services:   300.02 (F41.1) Generalized Anxiety Disorder.    [facility] Mental Health Outpatient Programs  TRACK: IOP 1    NUMBER OF PARTICIPANTS: 6        Data:    Session content: At the start of this group, patients were invited to check in by identifying themselves, describing their current emotional status, and identifying issues to address in this group.   Area(s) of treatment focus addressed in this session included Symptom Management and Personal Safety.  Pt reports a difficult weekend. She spent most of the time alone and is upset that her friends are often busy with family. Pt did attend a birthday party on Saturday and went out with friends that evening. She sets a goal to exercise and shower. She identifies coping skills as leisure. Denies safety concerns.    Therapeutic Interventions/Treatment Strategies:  Psychotherapist offered support, feedback and validation and reinforced use of skills. Treatment modalities used include Cognitive Behavioral Therapy. Interventions include Behavioral Activation: Explored how behaviors effect mood and interact with thoughts and feelings, Coping Skills: Discussed use of self-soothe skills to decrease distress in the body, Assisted patient in identifying 1-2 healthy distraction skills to reduce overall distress, and Assisted patient in understanding the purpose of planning / creating / participating / sharing in positive experiences, and Relationship Skills: discussed ways to meet  people.    Assessment:    Patient response:   Patient responded to session by accepting feedback, giving feedback, listening, focusing on goals, being attentive, accepting support, and appearing disengaged    Possible barriers to participation / learning include: and no barriers identified    Health Issues:   None reported       Substance Use Review:   Substance Use: alcohol .  and Last use: Saturday    Mental Status/Behavioral Observations  Appearance:   Appropriate   Eye Contact:   Fair   Psychomotor Behavior: Retarded (Slowed)   Attitude:   Cooperative  Evasive  Orientation:   All  Speech   Rate / Production: Normal    Volume:  Normal   Mood:    Angry  Anxious  Depressed  Agitated  Affect:    Flat  Worrisome   Thought Content:   Rumination  Thought Form:  Coherent  Obsessive     Insight:    Good     Plan:   Safety Plan: No current safety concerns identified.  Recommended that patient call 911 or go to the local ED should there be a change in any of these risk factors.   Barriers to treatment: None identified  Patient Contracts (see media tab):  None  Substance Use: Not addressed in session   Continue or Discharge: Patient will continue in Adult Day Treatment (ADT)  as planned. Patient is likely to benefit from learning and using skills as they work toward the goals identified in their treatment plan.      Katina Back, [facility]  January 22, 2024

## 2024-01-23 ENCOUNTER — HOSPITAL ENCOUNTER (OUTPATIENT)
Dept: BEHAVIORAL HEALTH | Facility: CLINIC | Age: 39
Discharge: HOME | End: 2024-01-23
Attending: PSYCHIATRY & NEUROLOGY
Payer: COMMERCIAL

## 2024-01-23 PROCEDURE — 90853 GROUP PSYCHOTHERAPY: CPT

## 2024-01-23 NOTE — GROUP NOTE
Psychotherapy Group Note    PATIENT'S NAME: Mariah Gaytan  MRN:   [id number]  :   1985  ACCT. NUMBER: [id number]  DATE OF SERVICE: 24  START TIME: 10:00 AM  END TIME: 10:50 AM  FACILITATOR: Katina Peraza LICSW  TOPIC: MH EBP Group: Coping Skills  [facility] Adult Mental Health Outpatient Programs  TRACK: IOP 1    NUMBER OF PARTICIPANTS: 9    Summary of Group / Topics Discussed:  Coping Skills: Additional Coping Skills/Nature:  Patients discussed the mental health benefits of spending time in nature.  Reviewed the benefits of applying the aforementioned coping strategies.  Patients explored how these strategies might be applied to daily stressors or distressing situations.     Patient Session Goals / Objectives:  Understand the purpose and benefits of nature   Learn about nature connection activities  Identify ways to spend time outdoors  Address barriers to utilizing coping skills when in distress.      Patient Participation / Response:  Fully participated with the group by sharing personal reflections / insights and openly received / provided feedback with other participants.    Demonstrated understanding of topics discussed through group discussion and participation, Expressed understanding of the relevance / importance of coping skills at distressing times in life, and Demonstrated knowledge of when to consider using a variety of coping skills in daily life    Treatment Plan:  Patient has a current master individualized treatment plan.  See Epic treatment plan for more information.    CATARINA Chandler

## 2024-01-23 NOTE — GROUP NOTE
"Process Group Note    PATIENT'S NAME: Mariah Gaytan  MRN:   [id number]  :   1985  ACCT. NUMBER: [id number]  DATE OF SERVICE: 24  START TIME:  9:00 AM  END TIME:  9:50 AM  FACILITATOR: Katina Peraza LICSW  TOPIC:  Process Group    Diagnoses:  Principal DSM5 Diagnoses  (Sustained by DSM5 Criteria Listed Above):   296.32 (F33.1) Major Depressive Disorder, Recurrent Episode, Moderate _ and With melancholic features.  3. Other Diagnoses that is relevant to services:   300.02 (F41.1) Generalized Anxiety Disorder.    [facility] Mental Health Outpatient Programs  TRACK: IOP 1    NUMBER OF PARTICIPANTS: 9        Data:    Session content: At the start of this group, patients were invited to check in by identifying themselves, describing their current emotional status, and identifying issues to address in this group.   Area(s) of treatment focus addressed in this session included Symptom Management and Personal Safety.  Pt reports feeling \"ok, but anxious\". She is disappointed that her insurance does not cover a gym membership discount. Pt feels positive about taking an hour walk yesterday. She is also proud of making dinners for a week. Pt felt good about going to bed earlier, but was woke up by neighbors again. She plans to reach out to them. Pt identifies coping skills as opposite to emotion. Denies safety concerns.    Therapeutic Interventions/Treatment Strategies:  Psychotherapist offered support, feedback and validation and reinforced use of skills. Treatment modalities used include Cognitive Behavioral Therapy. Interventions include Behavioral Activation: Explored how behaviors effect mood and interact with thoughts and feelings and Coping Skills: Discussed use of self-soothe skills to decrease distress in the body and Assisted patient in understanding the purpose of planning / creating / participating / sharing in positive experiences.    Assessment:    Patient response:   Patient " responded to session by accepting feedback, giving feedback, listening, focusing on goals, being attentive, and accepting support    Possible barriers to participation / learning include: and no barriers identified    Health Issues:   None reported       Substance Use Review:   Substance Use: No active concerns identified.    Mental Status/Behavioral Observations  Appearance:   Appropriate   Eye Contact:   Good   Psychomotor Behavior: Normal   Attitude:   Cooperative   Orientation:   All  Speech   Rate / Production: Normal    Volume:  Normal   Mood:    Anxious   Affect:    Subdued  but appears with a little brighter affect   Thought Content:   Clear  Thought Form:  Coherent  Logical     Insight:    Good     Plan:   Safety Plan: No current safety concerns identified.  Recommended that patient call 911 or go to the local ED should there be a change in any of these risk factors.   Barriers to treatment: None identified  Patient Contracts (see media tab):  None  Substance Use: Not addressed in session   Continue or Discharge: Patient will continue in Adult Day Treatment (ADT)  as planned. Patient is likely to benefit from learning and using skills as they work toward the goals identified in their treatment plan.      Katina Back, [facility]  January 23, 2024

## 2024-01-23 NOTE — GROUP NOTE
Psychotherapy Group Note    PATIENT'S NAME: Mariah Gaytan  MRN:   [id number]  :   1985  ACCT. NUMBER: [id number]  DATE OF SERVICE: 24  START TIME: 11:00 AM  END TIME: 11:50 AM  FACILITATOR: Bernice Mclain LICSW  TOPIC:  EBP Group: Symptom Awareness  [facility] Mental Health Outpatient Programs  TRACK: IOP 1    NUMBER OF PARTICIPANTS: 8    Summary of Group / Topics Discussed:  Symptom Awareness: Symptom Observation and Tracking: An overview of symptom observation and tracking was presented to help patients identify specific symptoms and identify patterns. This topic will also assist patient in identifying progress towards goal of decreasing severity of symptoms and increasing overall functioning. Patients completed a symptom check list in session. Patient was assisted in identifying baseline functioning, patterns, and ways to assess current symptoms. Patient was also assisted in identifying a tool or strategy to continue to track or monitor symptoms over a period of time.       Patient Session Goals / Objectives:  Identified patient individual symptoms and experiences  Identified potential symptom patterns and factors that contribute to changes in symptom severity      Patient Participation / Response:  Fully participated with the group by sharing personal reflections / insights and openly received / provided feedback with other participants.    Demonstrated understanding of topics discussed through group discussion and participation, Demonstrated understanding of how information regarding symptoms can assist in management of symptoms, Identified / Expressed personal readiness to increase awareness of symptoms and apply skills as necessary, Verbalized understanding of how awareness can benefit mental health symptoms , and Identified plan to address barriers to increase awareness and knowledge about diagnoses and symptoms     Treatment Plan:  Patient has a current master individualized  treatment plan.  See Epic treatment plan for more information.    CATARINA Gallego

## 2024-01-24 ENCOUNTER — HOSPITAL ENCOUNTER (OUTPATIENT)
Dept: BEHAVIORAL HEALTH | Facility: CLINIC | Age: 39
Discharge: HOME | End: 2024-01-24
Attending: PSYCHIATRY & NEUROLOGY
Payer: COMMERCIAL

## 2024-01-24 PROCEDURE — 90853 GROUP PSYCHOTHERAPY: CPT

## 2024-01-24 NOTE — GROUP NOTE
Psychotherapy Group Note    PATIENT'S NAME: Mariah Gaytan  MRN:   [id number]  :   1985  ACCT. NUMBER: [id number]  DATE OF SERVICE: 24  START TIME: 11:00 AM  END TIME: 11:50 AM  FACILITATOR: Bernice Mclain LICSW  TOPIC: MH EBP Group: Specialty Awareness  [facility] Mental Health Outpatient Programs  TRACK: IOP 1    NUMBER OF PARTICIPANTS: 6    Summary of Group / Topics Discussed:  Specialty Topics: Hope: The topic of hope was presented in order to help patients better understand the symptoms of hopelessness and how to become more hopeful. Patients discussed their current awareness of the topic and relevance to their functioning. Individual experiences with symptoms and treatment options were also discussed. Patients explored options for ongoing/future treatment and symptom management.      Patient Session Goals / Objectives:  Discussed definition of hopelessness  Discussed how hopelessness impacts functioning  Set a plan to utilize skills to reduce hopelessness      Patient Participation / Response:  Fully participated with the group by sharing personal reflections / insights and openly received / provided feedback with other participants.    Demonstrated understanding of topics discussed through group discussion and participation, Identified / Expressed readiness to act on skill suggestions discussed in topic, and Verbalized understanding of ways to proactively manage illness    Treatment Plan:  Patient has a current master individualized treatment plan.  See Epic treatment plan for more information.    CATARINA Gallego

## 2024-01-24 NOTE — GROUP NOTE
"Process Group Note    PATIENT'S NAME: Mariah Gaytan  MRN:   [id number]  :   1985  ACCT. NUMBER: [id number]  DATE OF SERVICE: 24  START TIME:  9:00 AM  END TIME:  9:50 AM  FACILITATOR: Katina Peraza LICSW  TOPIC:  Process Group    Diagnoses:  Principal DSM5 Diagnoses  (Sustained by DSM5 Criteria Listed Above):   296.32 (F33.1) Major Depressive Disorder, Recurrent Episode, Moderate _ and With melancholic features.  3. Other Diagnoses that is relevant to services:   300.02 (F41.1) Generalized Anxiety Disorder.    [facility] Mental Health Outpatient Programs  TRACK: IOP 1    NUMBER OF PARTICIPANTS: 6        Data:    Session content: At the start of this group, patients were invited to check in by identifying themselves, describing their current emotional status, and identifying issues to address in this group.   Area(s) of treatment focus addressed in this session included Symptom Management, Personal Safety, and Community Resources/Discharge Planning.  Pt reports feeling \"a little cranky\" today. She is upset by noisy neighbor, who wakes her up, in the morning. Discussed ways to express her concerns. Pt identifies coping skills as distraction. She sets a goal to go on a walk. Pt is proud of being here and grateful for the group. She would like to explore social connections. Denies safety concerns.    Therapeutic Interventions/Treatment Strategies:  Psychotherapist offered support, feedback and validation and reinforced use of skills. Treatment modalities used include Cognitive Behavioral Therapy. Interventions include Behavioral Activation: Explored how behaviors effect mood and interact with thoughts and feelings, Coping Skills: Discussed use of self-soothe skills to decrease distress in the body and Assisted patient in understanding the purpose of planning / creating / participating / sharing in positive experiences, and Relationship Skills: Assisted patients in implementing more " effective communication skills in their relationships.    Assessment:    Patient response:   Patient responded to session by accepting feedback, giving feedback, listening, focusing on goals, being attentive, and accepting support    Possible barriers to participation / learning include: and no barriers identified    Health Issues:   None reported       Substance Use Review:   Substance Use: No active concerns identified.    Mental Status/Behavioral Observations  Appearance:   Appropriate   Eye Contact:   Good   Psychomotor Behavior: Normal   Attitude:   Cooperative   Orientation:   All  Speech   Rate / Production: Normal    Volume:  Normal   Mood:    Angry  Anxious  Depressed   Affect:    Tearful Worrisome   Thought Content:   Rumination  Thought Form:  Coherent  Obsessive     Insight:    Good     Plan:   Safety Plan: No current safety concerns identified.  Recommended that patient call 911 or go to the local ED should there be a change in any of these risk factors.   Barriers to treatment: None identified  Patient Contracts (see media tab):  None  Substance Use: Not addressed in session   Continue or Discharge: Patient will continue in Adult Day Treatment (ADT)  as planned. Patient is likely to benefit from learning and using skills as they work toward the goals identified in their treatment plan.      Katina Back, LICSW  January 24, 2024

## 2024-01-24 NOTE — GROUP NOTE
Psychotherapy Group Note    PATIENT'S NAME: Mariah Gaytan  MRN:   [id number]  :   1985  ACCT. NUMBER: [id number]  DATE OF SERVICE: 24  START TIME: 10:00 AM  END TIME: 10:50 AM  FACILITATOR: Katina Peraza LICSW  TOPIC: MH EBP Group: Specialty Awareness  [facility] Adult Mental Health Outpatient Programs  TRACK: IOP 1    NUMBER OF PARTICIPANTS: 6    Summary of Group / Topics Discussed:  Specialty Topics: Forgiveness: Patients were provided with an overview of the topic of forgiveness including how to better understand the nature of forgiveness of others and oneself. Exploring the phases of forgiveness and how one works them was covered. Patients were able to explore how practicing forgiveness may positively impact their functioning.     Patient Session Goals / Objectives:  Discussed definitions of forgiveness and self-forgiveness  Explored the phases and process of forgiveness  Discussed benefits of forgiveness to their relationships with themselves and others, connecting the concept to mindfulness and acceptance  Assisted patients in recognizing when practicing forgiveness may be helpful      Patient Participation / Response:  Minimally participated, only when prompted / asked.    Demonstrated understanding of topics discussed through group discussion and participation    Treatment Plan:  Patient has a current master individualized treatment plan.  See Epic treatment plan for more information.    CATARINA Chandler

## 2024-01-25 ENCOUNTER — HOSPITAL ENCOUNTER (OUTPATIENT)
Dept: BEHAVIORAL HEALTH | Facility: CLINIC | Age: 39
Discharge: HOME | End: 2024-01-25
Attending: PSYCHIATRY & NEUROLOGY
Payer: COMMERCIAL

## 2024-01-25 PROCEDURE — 90853 GROUP PSYCHOTHERAPY: CPT

## 2024-01-25 NOTE — GROUP NOTE
Psychotherapy Group Note    PATIENT'S NAME: Mariah Gaytan  MRN:   [id number]  :   1985  ACCT. NUMBER: [id number]  DATE OF SERVICE: 24  START TIME: 10:00 AM  END TIME: 10:50 AM  FACILITATOR: Katina Peraza LICSW  TOPIC: MH EBP Group: Self-Awareness  [facility] Mental Health Outpatient Programs  TRACK: IOP 1    NUMBER OF PARTICIPANTS: 6    Summary of Group / Topics Discussed:  Self-Awareness: Self-Compassion: Patients received overview of key concepts in developing self-compassion. Patients discussed mindfulness, self-kindness, and finding common humanity. Patients identified their current approach to problems in their lives and learned skills for increasing self-compassion. Patients identified ways they can put self-compassion skills into practice and problem solve barriers to application of skills.     Patient Session Goals / Objectives:  [place] components of self-compassion  Identify ways to practice self-compassion in daily life  Problem solve barriers to self-compassion practice      Patient Participation / Response:  Fully participated with the group by sharing personal reflections / insights and openly received / provided feedback with other participants.    Demonstrated understanding of topics discussed through group discussion and participation and Identified / Expressed readiness to act intentionally, increase self-compassion, promote personal growth    Treatment Plan:  Patient has a current master individualized treatment plan.  See Epic treatment plan for more information.    CATARINA Chandler

## 2024-01-25 NOTE — GROUP NOTE
Process Group Note    PATIENT'S NAME: Mariah Gaytan  MRN:   [id number]  :   1985  ACCT. NUMBER: [id number]  DATE OF SERVICE: 24  START TIME:  9:00 AM  END TIME:  9:50 AM  FACILITATOR: Katina Peraza LICSW  TOPIC:  Process Group    Diagnoses:  Principal DSM5 Diagnoses  (Sustained by DSM5 Criteria Listed Above):   296.32 (F33.1) Major Depressive Disorder, Recurrent Episode, Moderate _ and With melancholic features.  3. Other Diagnoses that is relevant to services:   300.02 (F41.1) Generalized Anxiety Disorder.    [facility] Mental Health Outpatient Programs  TRACK: IOP 1    NUMBER OF PARTICIPANTS: 6        Data:    Session content: At the start of this group, patients were invited to check in by identifying themselves, describing their current emotional status, and identifying issues to address in this group.   Area(s) of treatment focus addressed in this session included Symptom Management and Personal Safety.  Pt reports feeling anxious today. She reports neck and back pain, because of stress. Pt identifies coping skills as distraction and humor. 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 Pt received encouragement to look at other job opportunities. She denies safety concerns.    Therapeutic Interventions/Treatment Strategies:  Psychotherapist offered support, feedback and validation and reinforced use of skills. Treatment modalities used include Cognitive Behavioral Therapy. Interventions include Behavioral Activation: Explored how behaviors effect mood and interact with thoughts and feelings and Other: Discussed ways to increase hopefulness.    Assessment:    Patient response:   Patient responded to session by accepting feedback, giving feedback, listening, focusing on goals, being attentive, and accepting support    Possible barriers to participation / learning include: and no barriers  identified    Health Issues:   Yes: Pain, Associated Psychological Distress       Substance Use Review:   Substance Use: No active concerns identified.    Mental Status/Behavioral Observations  Appearance:   Appropriate   Eye Contact:   Fair   Psychomotor Behavior: Normal   Attitude:   Cooperative   Orientation:   All  Speech   Rate / Production: Normal    Volume:  Normal   Mood:    Anxious  Depressed  Irritable  Fearful  Affect:    Flat  Worrisome   Thought Content:   Rumination  Thought Form:  Coherent  Obsessive     Insight:    Good     Plan:   Safety Plan: No current safety concerns identified.  Recommended that patient call 911 or go to the local ED should there be a change in any of these risk factors.   Barriers to treatment: None identified  Patient Contracts (see media tab):  None  Substance Use: Not addressed in session   Continue or Discharge: Patient will continue in Adult Day Treatment (ADT)  as planned. Patient is likely to benefit from learning and using skills as they work toward the goals identified in their treatment plan.      CATARINA Chandler  January 25, 2024

## 2024-01-25 NOTE — GROUP NOTE
Psychotherapy Group Note    PATIENT'S NAME: Mariah Gaytan  MRN:   [id number]  :   1985  ACCT. NUMBER: [id number]  DATE OF SERVICE: 24  START TIME: 11:00 AM  END TIME: 11:50 AM  FACILITATOR: Bernice Mclain LICSW  TOPIC: MH EBP Group: Coping Skills  [facility] Adult Mental Health Outpatient Programs  TRACK: IOP 1    NUMBER OF PARTICIPANTS: 6    Summary of Group / Topics Discussed:  Coping Skills: Building Positive Experiences: Patients discussed the importance of planning and engaging in positive experiences, as strategies to increase positive thinking, hope, and self-worth.  Explored the benefits of planning / creating positive experiences, including recognizing and reducing negativity bias by focusing on and building positive experiences.   Several approaches to building positive experiences were presented and discussed relevant to each patient.      Patient Session Goals / Objectives:  Understand the purpose of planning / creating / participating / sharing in positive experiences.  Explore patient s experiences related to negative thinking and how it influences activities and moodIdentify current positive events in patient s life.   Set goals to increase a variety of positive experiences.  Address barriers to planning / engaging in positive experiences.      Patient Participation / Response:  Fully participated with the group by sharing personal reflections / insights and openly received / provided feedback with other participants.    Demonstrated understanding of topics discussed through group discussion and participation, Expressed understanding of the relevance / importance of coping skills at distressing times in life, Demonstrated knowledge of when to consider using a variety of coping skills in daily life, Identified barriers to applying coping skills, and Identified 2-3 positive coping strategies that have helped maintain / improve symptoms in the past    Treatment Plan:  Patient has a  current master individualized treatment plan.  See Epic treatment plan for more information.    WILLIAMS GallegoSW

## 2024-01-29 ENCOUNTER — HOSPITAL ENCOUNTER (OUTPATIENT)
Dept: BEHAVIORAL HEALTH | Facility: CLINIC | Age: 39
Discharge: HOME | End: 2024-01-29
Attending: PSYCHIATRY & NEUROLOGY
Payer: COMMERCIAL

## 2024-01-29 PROCEDURE — 90853 GROUP PSYCHOTHERAPY: CPT

## 2024-01-29 NOTE — GROUP NOTE
Psychotherapy Group Note    PATIENT'S NAME: Mariah Gaytan  MRN:   [id number]  :   1985  ACCT. NUMBER: [id number]  DATE OF SERVICE: 24  START TIME: 10:00 AM  END TIME: 10:50 AM  FACILITATOR: Katina Peraza LICSW  TOPIC:  EBP Group: [facility] Adult Mental Health Outpatient Programs  TRACK: IOP 1    NUMBER OF PARTICIPANTS: 7    Summary of Group / Topics Discussed:  Mindfulness: What is Mindfulness: Patients received an overview on what mindfulness is and how mindfulness can benefit general health, mental health symptoms, and stressors. The history of mindfulness, its application to mental health therapies, and key concepts were also discussed. Patients discussed current awareness, knowledge, and practice of mindfulness skills. Patients also discussed barriers to mindfulness practice.     Patient Session Goals / Objectives:  Demonstrated understanding of key concepts and application to daily life  Identified when/how to use mindfulness   Resolved barriers to practice  Identified plan to use mindfulness in daily life      Patient Participation / Response:  Fully participated with the group by sharing personal reflections / insights and openly received / provided feedback with other participants.    Demonstrated understanding of topics discussed through group discussion and participation and Demonstrated understanding of mindfulness skills and benefits of practice    Treatment Plan:  Patient has a current master individualized treatment plan.  See Epic treatment plan for more information.    CATARINA Chandler

## 2024-01-29 NOTE — GROUP NOTE
Psychotherapy Group Note    PATIENT'S NAME: Mariah Gaytan  MRN:   [id number]  :   1985  ACCT. NUMBER: [id number]  DATE OF SERVICE: 24  START TIME: 11:00 AM  END TIME: 11:50 AM  FACILITATOR: Bernice Mclain LICSW  TOPIC:  EBP Group: Emotions Management  [facility] Mental Health Outpatient Programs  TRACK: IOP 1    NUMBER OF PARTICIPANTS: 7    Summary of Group / Topics Discussed:  Emotions Management: Understanding Emotions: Patients discussed the purpose of emotions and function they serve in our lives.  Reviewed core emotions, why they happen (triggers), and how they occur. The group assisted one anothers' understanding that: emotional experiences are important; difficult emotions have a place in our lives; and the differences between various emotions.    Patient Session Goals / Objectives:  Demonstrate understanding of types various emotions  Identify and discuss specific emotions and when they occur; understand triggers  Discuss barriers to emotional regulation      Patient Participation / Response:  Moderately participated, sharing some personal reflections / insights and adequately adequately received / provided feedback with other participants.    Demonstrated understanding of topics discussed through group discussion and participation, Expressed understanding of the relevance / importance of emotions management skills at distressing times in life, Self-aware of experiences with difficult emotions, and strategies to employ to manage them, and Demonstrated knowledge of when to consider applying a variety of emotions management skills in daily life    Treatment Plan:  Patient has a current master individualized treatment plan.  See Epic treatment plan for more information.    CATARINA Gallego

## 2024-01-29 NOTE — GROUP NOTE
"Process Group Note    PATIENT'S NAME: Mariah Gaytan  MRN:   [id number]  :   1985  ACCT. NUMBER: [id number]  DATE OF SERVICE: 24  START TIME:  9:00 AM  END TIME:  9:50 AM  FACILITATOR: Katina Pearza LICSW  TOPIC:  Process Group    Diagnoses:  Principal DSM5 Diagnoses  (Sustained by DSM5 Criteria Listed Above):   296.32 (F33.1) Major Depressive Disorder, Recurrent Episode, Moderate _ and With melancholic features.  3. Other Diagnoses that is relevant to services:   300.02 (F41.1) Generalized Anxiety Disorder.    [facility] Mental Health Outpatient Programs  TRACK: IOP 1    NUMBER OF PARTICIPANTS: 7        Data:    Session content: At the start of this group, patients were invited to check in by identifying themselves, describing their current emotional status, and identifying issues to address in this group.   Area(s) of treatment focus addressed in this session included Symptom Management and Personal Safety.  Pt reports feeling \"ok\" today. She feels positive about enjoying time with friends on Saturday. Pt feels proud of doing laundry. She identifies distraction and humor as coping skills. Pt feels proud of returning to , who made mistakes. They were able to fix some of these mistakes and she will see new  today. Denies safety concerns.    Therapeutic Interventions/Treatment Strategies:  Psychotherapist offered support, feedback and validation and reinforced use of skills. Treatment modalities used include Cognitive Behavioral Therapy. Interventions include Behavioral Activation: Explored how behaviors effect mood and interact with thoughts and feelings and Coping Skills: Discussed use of self-soothe skills to decrease distress in the body and Assisted patient in understanding the purpose of planning / creating / participating / sharing in positive experiences.    Assessment:    Patient response:   Patient responded to session by accepting feedback, giving feedback, " listening, focusing on goals, being attentive, and accepting support    Possible barriers to participation / learning include: and no barriers identified    Health Issues:   None reported       Substance Use Review:   Substance Use: alcohol  and cannabis .  and Last use: Saturday with friends    Mental Status/Behavioral Observations  Appearance:   Appropriate   Eye Contact:   Good   Psychomotor Behavior: Normal   Attitude:   Cooperative   Orientation:   All  Speech   Rate / Production: Normal    Volume:  Normal   Mood:    Anxious  Normal  Affect:    Appropriate   Thought Content:   Clear  Thought Form:  Coherent  Logical     Insight:    Good     Plan:   Safety Plan: No current safety concerns identified.  Recommended that patient call 911 or go to the local ED should there be a change in any of these risk factors.   Barriers to treatment: None identified  Patient Contracts (see media tab):  None  Substance Use: Not addressed in session   Continue or Discharge: Patient will continue in Adult Day Treatment (ADT)  as planned. Patient is likely to benefit from learning and using skills as they work toward the goals identified in their treatment plan.      Katina Back, LICSW  January 29, 2024

## 2024-01-30 ENCOUNTER — HOSPITAL ENCOUNTER (OUTPATIENT)
Dept: BEHAVIORAL HEALTH | Facility: CLINIC | Age: 39
Discharge: HOME | End: 2024-01-30
Attending: PSYCHIATRY & NEUROLOGY
Payer: COMMERCIAL

## 2024-01-30 PROCEDURE — 90853 GROUP PSYCHOTHERAPY: CPT

## 2024-01-30 NOTE — GROUP NOTE
"Process Group Note    PATIENT'S NAME: Mariah Gaytan  MRN:   [id number]  :   1985  ACCT. NUMBER: [id number]  DATE OF SERVICE: 24  START TIME:  9:00 AM  END TIME:  9:50 AM  FACILITATOR: Katina Peraza LICSW  TOPIC:  Process Group    Diagnoses:  Principal DSM5 Diagnoses  (Sustained by DSM5 Criteria Listed Above):   296.32 (F33.1) Major Depressive Disorder, Recurrent Episode, Moderate _ and With melancholic features.  3. Other Diagnoses that is relevant to services:   300.02 (F41.1) Generalized Anxiety Disorder.    [facility] Mental Health Outpatient Programs  TRACK: IOP 1    NUMBER OF PARTICIPANTS: 7        Data:    Session content: At the start of this group, patients were invited to check in by identifying themselves, describing their current emotional status, and identifying issues to address in this group.   Area(s) of treatment focus addressed in this session included Symptom Management and Personal Safety.  Pt reports feeling \"a little anxious\" today. She reports a decent day yesterday and feels positive about taking action steps ie: saw , shopped and cooked. She identifies coping skills as: self-care and humor. She sets a goal to go to yoga. Denies safety concerns.    Therapeutic Interventions/Treatment Strategies:  Psychotherapist offered support, feedback and validation and reinforced use of skills. Treatment modalities used include Cognitive Behavioral Therapy. Interventions include Behavioral Activation: Explored how behaviors effect mood and interact with thoughts and feelings and Coping Skills: Discussed use of self-soothe skills to decrease distress in the body, Assisted patient in identifying 1-2 healthy distraction skills to reduce overall distress, and Assisted patient in understanding the purpose of planning / creating / participating / sharing in positive experiences.    Assessment:    Patient response:   Patient responded to session by accepting feedback, " giving feedback, listening, focusing on goals, being attentive, and accepting support    Possible barriers to participation / learning include: and no barriers identified    Health Issues:   None reported       Substance Use Review:   Substance Use: No active concerns identified.    Mental Status/Behavioral Observations  Appearance:   Appropriate   Eye Contact:   Good   Psychomotor Behavior: Normal   Attitude:   Cooperative   Orientation:   All  Speech   Rate / Production: Normal    Volume:  Normal   Mood:    Anxious   Affect:    Subdued   Thought Content:   Clear  Thought Form:  Coherent  Logical     Insight:    Good     Plan:   Safety Plan: No current safety concerns identified.  Recommended that patient call 911 or go to the local ED should there be a change in any of these risk factors.   Barriers to treatment: None identified  Patient Contracts (see media tab):  None  Substance Use: Not addressed in session   Continue or Discharge: Patient will continue in Adult Day Treatment (ADT)  as planned. Patient is likely to benefit from learning and using skills as they work toward the goals identified in their treatment plan.      Katina Back, Pen[facility]sc[facility] Bay Medical CenterSW  January 30, 2024

## 2024-01-30 NOTE — GROUP NOTE
Psychoeducation Group Note    PATIENT'S NAME: Mariah Gaytan  MRN:   [id number]  :   1985  ACCT. NUMBER: [id number]  DATE OF SERVICE: 24  START TIME: 11:00 AM  END TIME: 11:50 AM  FACILITATOR: Bernice Mclain LICSW  TOPIC: MH Wellness Group: Health Maintenance  [facility] Adult Mental Health Outpatient Programs  TRACK: IOP 1    NUMBER OF PARTICIPANTS: 5    Summary of Group / Topics Discussed:  Health Maintenance: Discharge planning/Community resources: Patients worked on completing an instructor-facilitated discharge planning activity. Discharge planning begins for all patients after admission. Competent discharge planning promotes a successful transition and decreases the likelihood of mental health relapse. In this group, all dimensions of wellness were reviewed to assess for needs/discharge readiness. These dimensions included: physical, emotional, occupational/productivity, environmental, social, spiritual, intellectual, and financial. Patients worked on completing/updating their discharge planning and identifying their treatment needs prior to time of discharge.     Patient Session Goals / Objectives:  Identified unmet treatment needs to accomplish before discharge  Completed all dimensions of the discharge planning packet  Participated in the planning process, make phone calls, set up appointments, got connected with community resources, followed up with treatment team as needed         Patient Participation / Response:  Fully participated with the group by sharing personal reflections / insights and openly received / provided feedback with other participants.    Demonstrated understanding of topics discussed through group discussion and participation, Identified / Expressed personal readiness to practice skills, and Verbalized understanding of health maintenance topic    Treatment Plan:  Patient has a current master individualized treatment plan.  See Epic treatment plan for more  information.    Bernice Mclain, LICSW

## 2024-01-30 NOTE — GROUP NOTE
Psychotherapy Group Note    PATIENT'S NAME: Mariah Gaytan  MRN:   [id number]  :   1985  ACCT. NUMBER: [id number]  DATE OF SERVICE: 24  START TIME: 10:00 AM  END TIME: 10:50 AM  FACILITATOR: Katina Peraza LICSW  TOPIC:  EBP Group: Self-Awareness  [facility] Mental Health Outpatient Programs  TRACK: IOP 1    NUMBER OF PARTICIPANTS: 7    Summary of Group / Topics Discussed:  Self-Awareness: Personal Strengths/Life Purpose: Topic focused on assisting patients in identifying personal strengths and how they relate to the management of mental health symptoms. Patients discussed the benefits of acknowledging their personal strengths and their impact on mood improvement, mindfulness, and perspective. Patients worked to increase time spent on recognition and appreciation of what is positive and working in their lives. The goal is to reduce rumination and negative thinking resulting in increased mindfulness and resilience. Patients will work to put skills into practice and problem-solve barriers.     Patient Session Goals / Objectives:  Identified personal strengths  Identified barriers to recognition of personal strengths  Verbalized understanding of strategies to increase use of their strengths in management of daily symptoms      Patient Participation / Response:  Fully participated with the group by sharing personal reflections / insights and openly received / provided feedback with other participants.    Demonstrated understanding of topics discussed through group discussion and participation and Demonstrated understanding of values, strengths, and challenges to learn about themselves    Treatment Plan:  Patient has a current master individualized treatment plan.  See Epic treatment plan for more information.    CATARINA Chandler

## 2024-01-31 ENCOUNTER — HOSPITAL ENCOUNTER (OUTPATIENT)
Dept: BEHAVIORAL HEALTH | Facility: CLINIC | Age: 39
Discharge: HOME | End: 2024-01-31
Attending: PSYCHIATRY & NEUROLOGY
Payer: COMMERCIAL

## 2024-01-31 PROCEDURE — 90853 GROUP PSYCHOTHERAPY: CPT

## 2024-01-31 NOTE — GROUP NOTE
"Process Group Note    PATIENT'S NAME: Mariah Gaytan  MRN:   [id number]  :   1985  ACCT. NUMBER: [id number]  DATE OF SERVICE: 24  START TIME:  9:00 AM  END TIME:  9:50 AM  FACILITATOR: Katina Peraza LICSW  TOPIC:  Process Group    Diagnoses:  Principal DSM5 Diagnoses  (Sustained by DSM5 Criteria Listed Above):   296.32 (F33.1) Major Depressive Disorder, Recurrent Episode, Moderate _ and With melancholic features.  3. Other Diagnoses that is relevant to services:   300.02 (F41.1) Generalized Anxiety Disorder.    [facility] Mental Health Outpatient Programs  TRACK: IOP 1    NUMBER OF PARTICIPANTS: 6        Data:    Session content: At the start of this group, patients were invited to check in by identifying themselves, describing their current emotional status, and identifying issues to address in this group.   Area(s) of treatment focus addressed in this session included Symptom Management and Personal Safety.  Pt reports feeling \"a little anxious and distracted, but good overall\". She feels anxious because she has not heard back from her career counselor, but is trying to not jump to conclusions. Pt feels proud of going to yoga yesterday and plans to go again today. Denies safety concerns.    Therapeutic Interventions/Treatment Strategies:  Psychotherapist offered support, feedback and validation and reinforced use of skills. Treatment modalities used include Cognitive Behavioral Therapy. Interventions include Behavioral Activation: Explored how behaviors effect mood and interact with thoughts and feelings and Coping Skills: Discussed use of self-soothe skills to decrease distress in the body.    Assessment:    Patient response:   Patient responded to session by accepting feedback, giving feedback, listening, focusing on goals, being attentive, and accepting support    Possible barriers to participation / learning include: and no barriers identified    Health Issues:   None reported    "    Substance Use Review:   Substance Use: No active concerns identified.    Mental Status/Behavioral Observations  Appearance:   Appropriate   Eye Contact:   Good   Psychomotor Behavior: Normal   Attitude:   Cooperative   Orientation:   All  Speech   Rate / Production: Normal    Volume:  Normal   Mood:    Anxious  Normal  Affect:    Appropriate   Thought Content:   Clear  Thought Form:  Coherent  Logical     Insight:    Good     Plan:   Safety Plan: No current safety concerns identified.  Recommended that patient call 911 or go to the local ED should there be a change in any of these risk factors.   Barriers to treatment: None identified  Patient Contracts (see media tab):  None  Substance Use: Not addressed in session   Continue or Discharge: Patient will continue in Adult Day Treatment (ADT)  as planned. Patient is likely to benefit from learning and using skills as they work toward the goals identified in their treatment plan.      Katina Back, LICSW  January 31, 2024

## 2024-01-31 NOTE — GROUP NOTE
Psychotherapy Group Note    PATIENT'S NAME: Mariah Gaytan  MRN:   [id number]  :   1985  ACCT. NUMBER: [id number]  DATE OF SERVICE: 24  START TIME: 11:00 AM  END TIME: 11:50 AM  FACILITATOR: Bernice Mclain LICSW  TOPIC: MH EBP Group: Self-Awareness  [facility] Adult Mental Health Outpatient Programs  TRACK: IOP 1    NUMBER OF PARTICIPANTS: 6    Summary of Group / Topics Discussed:  Self-Awareness: Vision Board Activity: Topic focused on assisting patients in identifying personal strengths, future goals and how they relate to the management of mental health symptoms.  Patients used words, images and drawings to represent wellness goals and inspire them to take action towards those goals.      Patient Session Goals / Objectives:  Reflected on wellness/mental health goals for   Created a personal vision board using their values & strengths to motivate them towards their goals.  Verbalized understanding of strategies to increase use of their strengths in management of daily symptoms      Patient Participation / Response:  Fully participated with the group by sharing personal reflections / insights and openly received / provided feedback with other participants.    Demonstrated understanding of topics discussed through group discussion and participation, Demonstrated understanding of values, strengths, and challenges to learn about themselves, Identified / Expressed readiness to act intentionally, increase self-compassion, promote personal growth, Verbalized understanding of ways to proactively manage illness, and Practiced skills in session    Treatment Plan:  Patient has a current master individualized treatment plan.  See Epic treatment plan for more information.    CATARINA Gallego

## 2024-01-31 NOTE — GROUP NOTE
Psychotherapy Group Note    PATIENT'S NAME: Mariah Gaytan  MRN:   [id number]  :   1985  ACCT. NUMBER: [id number]  DATE OF SERVICE: 24  START TIME: 10:00 AM  END TIME: 10:50 AM  FACILITATOR: Katina Peraza LICSW  TOPIC:  EBP Group: Self-Awareness  [facility] Adult Mental Health Outpatient Programs  TRACK: IOP 1    NUMBER OF PARTICIPANTS: 6    Summary of Group / Topics Discussed:  Self-Awareness: Personal Strengths/Passions: Topic focused on assisting patients in identifying personal strengths and how they relate to the management of mental health symptoms. Patients discussed the benefits of acknowledging their personal strengths and their impact on mood improvement, mindfulness, and perspective. Patients worked to increase time spent on recognition and appreciation of what is positive and working in their lives. The goal is to reduce rumination and negative thinking resulting in increased mindfulness and resilience. Patients will work to put skills into practice and problem-solve barriers.     Patient Session Goals / Objectives:  Identified personal strengths  Identified barriers to recognition of personal strengths  Verbalized understanding of strategies to increase use of their strengths in management of daily symptoms      Patient Participation / Response:  Fully participated with the group by sharing personal reflections / insights and openly received / provided feedback with other participants.    Demonstrated understanding of topics discussed through group discussion and participation, Demonstrated understanding of values, strengths, and challenges to learn about themselves, and Identified / Expressed readiness to act intentionally, increase self-compassion, promote personal growth    Treatment Plan:  Patient has a current master individualized treatment plan.  See Epic treatment plan for more information.    CATARINA Chandler

## 2024-02-01 ENCOUNTER — HOSPITAL ENCOUNTER (OUTPATIENT)
Dept: BEHAVIORAL HEALTH | Facility: CLINIC | Age: 39
Discharge: HOME | End: 2024-02-01
Attending: PSYCHIATRY & NEUROLOGY
Payer: COMMERCIAL

## 2024-02-01 PROCEDURE — 90853 GROUP PSYCHOTHERAPY: CPT

## 2024-02-01 NOTE — GROUP NOTE
"Psychoeducation Group Note    PATIENT'S NAME: Mariah Gaytan  MRN:   [id number]  :   1985  ACCT. NUMBER: [id number]  DATE OF SERVICE: 24  START TIME: 10:00 AM  END TIME: 10:50 AM  FACILITATOR: Katina Peraza LICSW  TOPIC:  Wellness Group: [facility] Adult Mental Health Outpatient Programs  TRACK: IOP 1    NUMBER OF PARTICIPANTS: 6    Summary of Group / Topics Discussed:  Specialty Health:  Specialty [facility]: Polyvagal Theory    Patient Session Goals / Objectives:    Review Polyvagal Theory powerpoint   Review Polyvagal Ladder (Ventral vagal, dorsal vagal, sympathetic)  Discuss how \"Play\" may be utilized as a \"Ventral Vagal [place]\"  Discuss questions about Polyvagal Theory        Patient Participation / Response:  {OP MH PROGRESS NOTE PATIENT PARTICIPATION:049778}    {OP  RN GROUP NOTE SPECIALTY HEALTH:798182}    Treatment Plan:  Patient has {OP  PROGRAMMATIC TX PLAN STATUS:128366}    CATARINA Chandler  "

## 2024-02-01 NOTE — GROUP NOTE
"Process Group Note    PATIENT'S NAME: Mariah Gaytan  MRN:   [id number]  :   1985  ACCT. NUMBER: [id number]  DATE OF SERVICE: 24  START TIME:  9:00 AM  END TIME:  9:50 AM  FACILITATOR: Katina Peraza LICSW  TOPIC:  Process Group    Diagnoses:  Principal DSM5 Diagnoses  (Sustained by DSM5 Criteria Listed Above):   296.32 (F33.1) Major Depressive Disorder, Recurrent Episode, Moderate _ and With melancholic features.  3. Other Diagnoses that is relevant to services:   300.02 (F41.1) Generalized Anxiety Disorder.    [facility] Mental Health Outpatient Programs  TRACK: IOP 1    NUMBER OF PARTICIPANTS: 7        Data:    Session content: At the start of this group, patients were invited to check in by identifying themselves, describing their current emotional status, and identifying issues to address in this group.   Area(s) of treatment focus addressed in this session included Symptom Management and Personal Safety.  Pt reports feeling \"ok and a little more optimistic\". She feels proud about going to yoga again and enjoyed running into an acquaintance there. She feels positive about the news that her brother broke up with his dysfunctional girlfriend. She identifies coping skills as self-care and denies safety concerns.    Therapeutic Interventions/Treatment Strategies:  Psychotherapist offered support, feedback and validation and reinforced use of skills. Treatment modalities used include Cognitive Behavioral Therapy. Interventions include Behavioral Activation: Explored how behaviors effect mood and interact with thoughts and feelings.    Assessment:    Patient response:   Patient responded to session by accepting feedback, giving feedback, listening, focusing on goals, being attentive, and accepting support    Possible barriers to participation / learning include: and no barriers identified    Health Issues:   None reported       Substance Use Review:   Substance Use: cannabis .  and Last " use: last night    Mental Status/Behavioral Observations  Appearance:   Appropriate   Eye Contact:   Good   Psychomotor Behavior: Normal   Attitude:   Cooperative   Orientation:   All  Speech   Rate / Production: Normal    Volume:  Normal   Mood:    Anxious  Normal  Affect:    Appropriate   Thought Content:   Clear  Thought Form:  Coherent  Logical     Insight:    Good     Plan:   Safety Plan: No current safety concerns identified.  Recommended that patient call 911 or go to the local ED should there be a change in any of these risk factors.   Barriers to treatment: None identified  Patient Contracts (see media tab):  None  Substance Use: Not addressed in session   Continue or Discharge: Patient will continue in Adult Day Treatment (ADT)  as planned. Patient is likely to benefit from learning and using skills as they work toward the goals identified in their treatment plan.      Katina Back, WILLIAMSSW  February 1, 2024

## 2024-02-01 NOTE — GROUP NOTE
Psychotherapy Group Note    PATIENT'S NAME: aMriah Gaytan  MRN:   [id number]  :   1985  ACCT. NUMBER: [id number]  DATE OF SERVICE: 24  START TIME: 11:00 AM  END TIME: 11:50 AM  FACILITATOR: Bernice Mclain LICSW  TOPIC:  EBP Group: Behavioral Activation  [facility] Mental Health Outpatient Programs  TRACK: IOP 1    NUMBER OF PARTICIPANTS: 6    Summary of Group / Topics Discussed:  Behavioral Activation: Building Healthy Habits: Patients explored existing habits and how specific behaviors impact thoughts and feelings.  The group explored the effect of negative and positive activities on mood states and thought patterns.  Patients identified activities that help to improve mood and thinking patterns, and developed a plan to implement healthy behaviors.      Patient Session Goals / Objectives:  Identify impact of current habitual behaviors on thoughts and mood  Discuss tips & strategies to form and maintain helpful habits. (Building New Habits - Therapist Aid)  Identify 2-3 behavioral changes that could have a positive impact on thoughts and mood  Prepare to make desired behavioral change: Create a habit plan (Therapist Aid)      Patient Participation / Response:  Fully participated with the group by sharing personal reflections / insights and openly received / provided feedback with other participants.    Demonstrated understanding of topics discussed through group discussion and participation, Expressed understanding of the relationship between behaviors, thoughts, and feelings, Shared experiences and challenges with making behavioral changes, Identified barriers to change, and Identified ways to increase goal directed activities    Treatment Plan:  Patient has a current master individualized treatment plan.  See Epic treatment plan for more information.    CATARINA Gallego

## 2024-02-01 NOTE — GROUP NOTE
"Psychoeducation Group Note    PATIENT'S NAME: Mariah Gaytan  MRN:   [id number]  :   1985  ACCT. NUMBER: [id number]  DATE OF SERVICE: 24  START TIME: 10:00 AM  END TIME: 10:50 AM  FACILITATOR: Katina Peraza LICSW; Michelle Currie RN  TOPIC:  Wellness Group: [facility] Adult Mental Health Outpatient Programs  TRACK: IOP 1    NUMBER OF PARTICIPANTS: 6    Summary of Group / Topics Discussed:  [facility]:  [facility]: Polyvagal Theory    Patient Session Goals / Objectives:    Review Polyvagal Theory powerpoint   Review Polyvagal Ladder (Ventral vagal, dorsal vagal, sympathetic)  Discuss how \"Play\" may be utilized as a \"Ventral Vagal [place]\"  Discuss questions about Polyvagal Theory      Patient Participation / Response:  Fully participated with the group by sharing personal reflections / insights and openly received / provided feedback with other participants.    Demonstrated understanding of topics discussed through group discussion and participation and Identified / Expressed personal readiness to practice skills    Treatment Plan:  Patient has a current master individualized treatment plan.  See Epic treatment plan for more information.    CATARINA Chandler  "

## 2024-02-05 ENCOUNTER — HOSPITAL ENCOUNTER (OUTPATIENT)
Dept: BEHAVIORAL HEALTH | Facility: CLINIC | Age: 39
Discharge: HOME | End: 2024-02-05
Attending: PSYCHIATRY & NEUROLOGY
Payer: COMMERCIAL

## 2024-02-05 PROCEDURE — 90853 GROUP PSYCHOTHERAPY: CPT

## 2024-02-05 NOTE — GROUP NOTE
"Process Group Note    PATIENT'S NAME: Mariah Gaytan  MRN:   [id number]  :   1985  ACCT. NUMBER: [id number]  DATE OF SERVICE: 24  START TIME:  9:00 AM  END TIME:  9:50 AM  FACILITATOR: Katina Peraza LICSW  TOPIC:  Process Group    Diagnoses:  Principal DSM5 Diagnoses  (Sustained by DSM5 Criteria Listed Above):   296.32 (F33.1) Major Depressive Disorder, Recurrent Episode, Moderate _ and With melancholic features.  3. Other Diagnoses that is relevant to services:   300.02 (F41.1) Generalized Anxiety Disorder.    [facility] Mental Health Outpatient Programs  TRACK: IOP 1    NUMBER OF PARTICIPANTS: 6        Data:    Session content: At the start of this group, patients were invited to check in by identifying themselves, describing their current emotional status, and identifying issues to address in this group.   Area(s) of treatment focus addressed in this session included Symptom Management and Personal Safety.  Pt reports \"a mixed weekend of good and bad\". She is upset after learning that she needs expensive dental work (invisalign). Pt reports enjoying her yoga class and seeing a friend. She identifies coping skills as: distraction, mindfulness and leisure. She denies safety concerns.    Therapeutic Interventions/Treatment Strategies:  Psychotherapist offered support, feedback and validation and reinforced use of skills. Treatment modalities used include Cognitive Behavioral Therapy. Interventions include Behavioral Activation: Explored how behaviors effect mood and interact with thoughts and feelings and Symptoms Management: Promoted understanding of their diagnoses and how it impacts their functioning.    Assessment:    Patient response:   Patient responded to session by accepting feedback, listening, focusing on goals, being attentive, and accepting support    Possible barriers to participation / learning include: and no barriers identified    Health Issues:   None reported     "   Substance Use Review:   Substance Use: alcohol  and cannabis .  and Last use: Saturday    Mental Status/Behavioral Observations  Appearance:   Appropriate   Eye Contact:   Good   Psychomotor Behavior: Normal   Attitude:   Cooperative   Orientation:   All  Speech   Rate / Production: Normal    Volume:  Normal   Mood:    Anxious   Affect:    Worrisome   Thought Content:   Clear  Thought Form:  Coherent  Logical     Insight:    Good     Plan:   Safety Plan: No current safety concerns identified.  Recommended that patient call 911 or go to the local ED should there be a change in any of these risk factors.   Barriers to treatment: None identified  Patient Contracts (see media tab):  None  Substance Use: Not addressed in session   Continue or Discharge: Patient will continue in Adult Day Treatment (ADT)  as planned. Patient is likely to benefit from learning and using skills as they work toward the goals identified in their treatment plan.      Katina Back, Down East Community HospitalSW  February 5, 2024

## 2024-02-05 NOTE — GROUP NOTE
Psychotherapy Group Note    PATIENT'S NAME: Mariah Gaytan  MRN:   [id number]  :   1985  ACCT. NUMBER: [id number]  DATE OF SERVICE: 24  START TIME: 11:00 AM  END TIME: 11:50 AM  FACILITATOR: Bernice Mclain LICSW  TOPIC:  EBP Group: Coping Skills  [facility] Adult Mental Health Outpatient Programs  TRACK: IOP 1    NUMBER OF PARTICIPANTS: 6    Summary of Group / Topics Discussed:  Coping Skills: Self-Soothe: Patients learned to apply self-soothe as a way to decrease heightened stress in the moment.  Patients identified situations that necessitate self-soothe strategies.  They focused on ways to manage physical symptoms of distress using the senses. They discussed how to distinguish when this can be useful in their lives when other strategies are more relevant or helpful.    Patient Session Goals / Objectives:  Understand the purpose of using the senses to decrease distress  Process what happens in the body when using self-soothe strategies  Demonstrate understanding of when to use self-soothe strategies  Identify and problem solve barriers to applying self-soothe strategies.  Choose 1-2 self-soothe strategies to apply during times of distress.      Patient Participation / Response:  Fully participated with the group by sharing personal reflections / insights and openly received / provided feedback with other participants.    Demonstrated understanding of topics discussed through group discussion and participation, Expressed understanding of the relevance / importance of coping skills at distressing times in life, Demonstrated knowledge of when to consider using a variety of coping skills in daily life, Identified barriers to applying coping skills, and Identified 2-3 positive coping strategies that have helped maintain / improve symptoms in the past    Treatment Plan:  Patient has a current master individualized treatment plan.  See Epic treatment plan for more information.    Bernice Mclain  LICSW

## 2024-02-05 NOTE — GROUP NOTE
Psychoeducation Group Note    PATIENT'S NAME: Mariah Gaytan  MRN:   [id number]  :   1985  ACCT. NUMBER: [id number]  DATE OF SERVICE: 24  START TIME: 10:00 AM  END TIME: 10:50 AM  FACILITATOR: Katina Peraza LICSW  TOPIC:  Wellness Group: [facility] Adult Mental Health Outpatient Programs  TRACK: IOP 1    NUMBER OF PARTICIPANTS: 6    Summary of Group / Topics Discussed:  Foundations of Health: Sleep: sleep hygiene: Patients explored the connection between sleep and mental illness. Patients learned about how adequate sleep can improve health, productivity, wellness, quality of life, and safety.     Patient Session Goals / Objectives:  Demonstrated understanding of sleep hygiene practices and benefits of sleep  Identified sleep hygiene strategies to utilize   Described the connection between sleep disturbances and mental illness      Patient Participation / Response:  Minimally participated, only when prompted / asked.    Demonstrated understanding of topics discussed through group discussion and participation    Treatment Plan:  Patient has a current master individualized treatment plan.  See Epic treatment plan for more information.    CATARINA Chandler

## 2024-02-06 ENCOUNTER — HOSPITAL ENCOUNTER (OUTPATIENT)
Dept: BEHAVIORAL HEALTH | Facility: CLINIC | Age: 39
Discharge: HOME | End: 2024-02-06
Attending: PSYCHIATRY & NEUROLOGY
Payer: COMMERCIAL

## 2024-02-06 PROCEDURE — 90853 GROUP PSYCHOTHERAPY: CPT

## 2024-02-06 NOTE — GROUP NOTE
Psychotherapy Group Note    PATIENT'S NAME: Mariah Gaytan  MRN:   [id number]  :   1985  ACCT. NUMBER: [id number]  DATE OF SERVICE: 24  START TIME: 10:00 AM  END TIME: 10:50 AM  FACILITATOR: Katina Peraza LICSW  TOPIC: MH EBP Group: Coping Skills  [facility] Adult Mental Health Outpatient Programs  TRACK: IOP 1    NUMBER OF PARTICIPANTS: 5    Summary of Group / Topics Discussed:  Coping Skills: Additional Coping Skills/Looking Back:  Patients discussed the importance of looking back on your life and taking credit for accomplishments.  Reviewed the benefits of applying the aforementioned coping strategies.  Patients explored how these strategies might be applied to daily stressors or distressing situations.    Patient Session Goals / Objectives:  Understand the purpose and benefits of applying Looking Back coping strategies  Identified accomplishments from the recent past  Address barriers to utilizing coping skills when in distress.      Patient Participation / Response:  Fully participated with the group by sharing personal reflections / insights and openly received / provided feedback with other participants.    Demonstrated understanding of topics discussed through group discussion and participation, Expressed understanding of the relevance / importance of coping skills at distressing times in life, and Identified / Expressed personal readiness to practice new coping skills    Treatment Plan:  Patient has a current master individualized treatment plan.  See Epic treatment plan for more information.    CATARINA Chandler

## 2024-02-06 NOTE — GROUP NOTE
Psychoeducation Group Note    PATIENT'S NAME: Mariah Gaytan  MRN:   [id number]  :   1985  ACCT. NUMBER: [id number]  DATE OF SERVICE: 24  START TIME: 11:00 AM  END TIME: 11:50 AM  FACILITATOR: Bernice Mclain LICSW  TOPIC: MH Wellness Group: Mental Health Maintenance  [facility] Adult Mental Health Outpatient Programs  TRACK: IOP 1    NUMBER OF PARTICIPANTS: 4    Summary of Group / Topics Discussed:  Mental Health Maintenance:  Stigma: In this group patients explored stigma surrounding a mental health diagnosis.  The group discussed the way stigma impacts their own life, and discussed strategies to reduce. The relationship between physical and mental health were also explored in the context of healthcare access, treatment, and support.    Patient Session Goals / Objectives:  Patients identified the importance of practicing emotional hygiene  Patients identified ways to decrease the  impact of stigma in their own life      Patient Participation / Response:  Fully participated with the group by sharing personal reflections / insights and openly received / provided feedback with other participants.    Demonstrated understanding of topics discussed through group discussion and participation, Identified / Expressed personal readiness to practice skills, and Verbalized understanding of mental health maintenance topic    Treatment Plan:  Patient has a current master individualized treatment plan.  See Epic treatment plan for more information.    CATARINA Gallego

## 2024-02-06 NOTE — GROUP NOTE
"Process Group Note    PATIENT'S NAME: Mariah Gaytan  MRN:   [id number]  :   1985  ACCT. NUMBER: [id number]  DATE OF SERVICE: 24  START TIME:  9:00 AM  END TIME:  9:50 AM  FACILITATOR: Katina Peraza LICSW  TOPIC:  Process Group    Diagnoses:  Principal DSM5 Diagnoses  (Sustained by DSM5 Criteria Listed Above):   296.32 (F33.1) Major Depressive Disorder, Recurrent Episode, Moderate _ and With melancholic features.  3. Other Diagnoses that is relevant to services:   300.02 (F41.1) Generalized Anxiety Disorder.    [facility] Mental Health Outpatient Programs  TRACK: IOP 1    NUMBER OF PARTICIPANTS: 5        Data:    Session content: At the start of this group, patients were invited to check in by identifying themselves, describing their current emotional status, and identifying issues to address in this group.   Area(s) of treatment focus addressed in this session included Symptom Management, Personal Safety, and Community Resources/Discharge Planning.  Pt reports feeling \"anxious and a little irritable\". She is worried about job status and feels uncertain about career path to follow. 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 She has not heard from her job counselor yet. We discussed other job options and she will consider. Pt identifies coping skills as self-care, distraction and yoga. Denies safety concerns.    Therapeutic Interventions/Treatment Strategies:  Psychotherapist offered support, feedback and validation, provided redirection, and reinforced use of skills. Treatment modalities used include Cognitive Behavioral Therapy. Interventions include Behavioral Activation: Explored how behaviors effect mood and interact with thoughts and feelings and Coping Skills: Discussed use of self-soothe skills to decrease distress in the body, Assisted patient in identifying 1-2 healthy distraction skills to reduce overall distress, Discussed how the use of intentional \"in the moment\" actions can help reduce " distress, and Reviewed patients current calming practices and discussed a more formal way of practicing and accessing skills.    Assessment:    Patient response:   Patient responded to session by accepting feedback, giving feedback, listening, focusing on goals, being attentive, and accepting support    Possible barriers to participation / learning include: and no barriers identified    Health Issues:   None reported       Substance Use Review:   Substance Use: No active concerns identified.    Mental Status/Behavioral Observations  Appearance:   Appropriate   Eye Contact:   Good   Psychomotor Behavior: Normal   Attitude:   Cooperative   Orientation:   All  Speech   Rate / Production: Normal    Volume:  Normal   Mood:    Anxious  Irritable   Affect:    Tearful Worrisome   Thought Content:   Rumination  Thought Form:  Coherent     Insight:    Good     Plan:   Safety Plan: No current safety concerns identified.  Recommended that patient call 911 or go to the local ED should there be a change in any of these risk factors.   Barriers to treatment: None identified  Patient Contracts (see media tab):  None  Substance Use: Not addressed in session   Continue or Discharge: Patient will continue in Adult Day Treatment (ADT)  as planned. Patient is likely to benefit from learning and using skills as they work toward the goals identified in their treatment plan.      Katina Back, [facility]  February 6, 2024

## 2024-02-07 ENCOUNTER — HOSPITAL ENCOUNTER (OUTPATIENT)
Dept: BEHAVIORAL HEALTH | Facility: CLINIC | Age: 39
Discharge: HOME | End: 2024-02-07
Attending: PSYCHIATRY & NEUROLOGY
Payer: COMMERCIAL

## 2024-02-07 DIAGNOSIS — F41.1 GENERALIZED ANXIETY DISORDER: ICD-10-CM

## 2024-02-07 DIAGNOSIS — F33.1 MAJOR DEPRESSIVE DISORDER, RECURRENT EPISODE, MODERATE (H): Primary | ICD-10-CM

## 2024-02-07 PROCEDURE — 99214 OFFICE O/P EST MOD 30 MIN: CPT | Performed by: PSYCHIATRY & NEUROLOGY

## 2024-02-07 PROCEDURE — 90853 GROUP PSYCHOTHERAPY: CPT

## 2024-02-07 NOTE — GROUP NOTE
"Psychotherapy Group Note    PATIENT'S NAME: Mariah Gaytan  MRN:   [id number]  :   1985  ACCT. NUMBER: [id number]  DATE OF SERVICE: 24  START TIME: 10:00 AM  END TIME: 10:50 AM  FACILITATOR: Katina Peraza LICSW  TOPIC:  EBP Group: Coping Skills  [facility] Adult Mental Health Outpatient Programs  TRACK: IOP 1    NUMBER OF PARTICIPANTS: 7    Summary of Group / Topics Discussed:  Coping Skills: Additional Coping Skills/Looking Forward:  Patients discussed and practiced Looking Forward.  Reviewed the benefits of applying the aforementioned coping strategies.  Patients explored how these strategies might be applied to daily stressors or distressing situations.    Patient Session Goals / Objectives:  Understand the purpose and benefits of applying looking forward coping strategies  Identify \"your best possible self\" in the near future and steps to get there  Address barriers to utilizing coping skills when in distress.      Patient Participation / Response:  Moderately participated, sharing some personal reflections / insights and adequately adequately received / provided feedback with other participants.    Demonstrated understanding of topics discussed through group discussion and participation    Treatment Plan:  Patient has a current master individualized treatment plan.  See Epic treatment plan for more information.    CATARINA Chandler     "

## 2024-02-07 NOTE — GROUP NOTE
"Process Group Note    PATIENT'S NAME: Mariah Gaytan  MRN:   [id number]  :   1985  ACCT. NUMBER: [id number]  DATE OF SERVICE: 24  START TIME:  9:00 AM  END TIME:  9:50 AM  FACILITATOR: Katina Peraza LICSW  TOPIC:  Process Group    Diagnoses:  Principal DSM5 Diagnoses  (Sustained by DSM5 Criteria Listed Above):   296.32 (F33.1) Major Depressive Disorder, Recurrent Episode, Moderate _ and With melancholic features.  3. Other Diagnoses that is relevant to services:   300.02 (F41.1) Generalized Anxiety Disorder.    [facility] Mental Health Outpatient Programs  TRACK: IOP 1    NUMBER OF PARTICIPANTS: 7        Data:    Session content: At the start of this group, patients were invited to check in by identifying themselves, describing their current emotional status, and identifying issues to address in this group.   Area(s) of treatment focus addressed in this session included Symptom Management and Personal Safety.  Pt reports feeling \"anxious, but ok\". She reports feeling proud of setting boundaries with sister-in-law's sister regarding her trip to [facility]. Pt is looking forward to this trip later this winter. She continues to go to yoga and feels better because of it. Denies safety concerns.    Therapeutic Interventions/Treatment Strategies:  Psychotherapist offered support, feedback and validation and reinforced use of skills. Treatment modalities used include Cognitive Behavioral Therapy. Interventions include Coping Skills: Discussed use of self-soothe skills to decrease distress in the body, Assisted patient in identifying 1-2 healthy distraction skills to reduce overall distress, Promoted understanding of how and when to apply grounding strategies to reduce distress and increase presence in the moment, and Assisted patient in understanding the purpose of planning / creating / participating / sharing in positive experiences and Relationship Skills: Encouraged development and " maintenance  of healthy boundaries.    Assessment:    Patient response:   Patient responded to session by accepting feedback, giving feedback, listening, focusing on goals, being attentive, and accepting support    Possible barriers to participation / learning include: and no barriers identified    Health Issues:   None reported       Substance Use Review:   Substance Use: No active concerns identified.    Mental Status/Behavioral Observations  Appearance:   Appropriate   Eye Contact:   Good   Psychomotor Behavior: Normal   Attitude:   Cooperative   Orientation:   All  Speech   Rate / Production: Normal    Volume:  Normal   Mood:    Depressed   Affect:    Subdued   Thought Content:   Clear  Thought Form:  Coherent     Insight:    Good     Plan:   Safety Plan: No current safety concerns identified.  Recommended that patient call 911 or go to the local ED should there be a change in any of these risk factors.   Barriers to treatment: None identified  Patient Contracts (see media tab):  None  Substance Use: Not addressed in session   Continue or Discharge: Patient will continue in Adult Day Treatment (ADT)  as planned. Patient is likely to benefit from learning and using skills as they work toward the goals identified in their treatment plan.      Katina Back, [facility]  February 7, 2024

## 2024-02-07 NOTE — PROGRESS NOTES
"[facility]   Adult Mental Health Outpatient Programs  Psychiatric Progress Record    Program: Intensive Outpatient Program, track 1    PATIENT'S NAME: Mariah Gaytan  MRN:   [id number]  :   1985  ACCT. NUMBER: [id number]  DATE OF SERVICE: 24    Interval History:  \"Okay.\" Mariah presents today for follow-up and ongoing program supervision.   Endorses:  \"I don't know that they're [referring to overall function/symptoms] a ton better\"  \"Still depressed, still anxious\"  \"Trying to build healthier habits\"  \"Going to yoga... at least every other day\"  Discussed skills and other interventions in IOP.  \"Some of it feels useful; some of it feels repetitive from partial\"  Overall, \"I think it is [helpful]\"  Last day at work month from tomorrow  Goal to find new work, including possible career change  \"Some hopelessness sometimes\" but denies suicidal ideation     Symptoms/Systems:  Sleep: \"something I'm working on, I'm a night owl\"    Substance use:  denies    Safety Assessment:  Suicidal ideation: see above  Thoughts of non-suicidal self-injury: denied  Recent self-injurious behavior: denied  Homicidal ideation: denied  Other safety concerns: denied    Medications:  Current Outpatient Medications   Medication Sig Dispense Refill    biotin 1000 MCG TABS tablet Take 1,000 mcg by mouth daily      buPROPion HCl ER, XL, 450 MG TB24 Take 450 mg by mouth daily      busPIRone HCl (BUSPAR) 30 MG tablet Take 30 mg by mouth 2 times daily      cetirizine (ZYRTEC) 5 MG tablet Take 5 mg by mouth daily      clonazePAM (KLONOPIN) 0.5 MG ODT Take 0.5 mg by mouth 2 times daily as needed for anxiety      levonorgestrel (MIRENA) 52 MG (20 mcg/day) IUD by Intrauterine route once      multivitamin, therapeutic (THERA-VIT) TABS tablet Take 1 tablet by mouth daily      spironolactone (ALDACTONE) 100 MG tablet Take 100 mg by mouth daily         The above list was reviewed and updated in EPIC with patient today.     Patient is " "taking medications as prescribed and denies adverse effects    Laboratory Results:  Most recent labs reviewed. Pertinent updates/findings: None.     Metrics:  PHQ-9 scores:       12/27/2023    10:00 AM 1/3/2024     4:21 PM 1/4/2024     9:48 AM   PHQ-9 SCORE   PHQ-9 Total Score MyChart  9 (Mild depression)    PHQ-9 Total Score 13 9    9 9       KURT-7 scores:       12/27/2023    10:00 AM 1/4/2024     9:48 AM   KURT-7 SCORE   Total Score 9 7       CSSR-S: [place] Suicide Severity Rating Scale (Short Version)      12/27/2023    10:00 AM 1/4/2024     9:00 AM   [place] Suicide Severity Rating (Short Version)   Q1 Wished to be Dead (Past Month) yes yes   Q2 Suicidal Thoughts (Past Month) yes no   Q3 Suicidal Thought Method no    Q4 Suicidal Intent without Specific Plan no    Q5 Suicide Intent with Specific Plan no    Q6 Suicide Behavior (Lifetime) no    Level of Risk per Screen low risk          Mental Status Examination:  Vital Signs: There were no vitals taken for this visit.   Appearance: appropriately groomed, appears stated age, and in no apparent distress.  Attitude: cooperative   Eye Contact: good   Muscle Strength and Tone: no gross abnormalities   Psychomotor Behavior: normal or unremarkable   Gait and Station: normal width, turn, arm swing  Speech: normal rate, production, volume, and rhythm of, decreased prosody  Associations: No loosening of associations  Thought Process: coherent and goal directed  Thought Content: no evidence of suicidal ideation or homicidal ideation, no evidence of psychotic thought, no auditory hallucinations present, and no visual hallucinations present  Mood: \"anxious and depressed\"  Affect: mood congruent, intensity is blunted, constricted mobility, and reactive  Insight: good  Judgment: intact, adequate for safety  Impulse Control: intact  Oriented to: time, place, person, and situation  Attention Span and Concentration: normal  Language: Intact  Recent and Remote Memory: intact to " interview. Not formally assessed. No amnesia.  Fund of Knowledge/Assessment of Intelligence: Average  Capacity of Activities of Daily Living: Independent, able to participate in programmatic care services.    Diagnosis/es:    ICD-10-CM    1. Major depressive disorder, recurrent episode, moderate (H)  F33.1       2. Generalized anxiety disorder  F41.1           Assessment/Plan:  Mariah presents today for follow up. Endorses ongoing symptoms that continue to interfere with function. Patient still not working, does not feel she will be able to return to work without exacerbating symptoms further and threatening recovery. Continues to benefit from time spent in group. Continue IOP.    Has made progress. Medications are managed by outpatient provider and I see no immediate need for changes. Continue current medications.    Depression   Overall improved   Still quite severe; by no means remitted  Continue current medication  Continue IOP    Anxiety   Overall improved  See above    Continue all other medications as reviewed per electronic medical record today    Continue therapy as planned:  Enrolled in IOP  Continues to benefit from services  Continues to meet criteria for this level of care  Patient would be at reasonable risk of requiring a higher level of care in the absence of current services.  I feel this patient does not meet criteria for an involuntary hold and is appropriate for treatment at an outpatient level of care.  Continue with individual therapist as appropriate    Safety plan reviewed:  To the Emergency Department as needed or call after hours crisis line at [phone] or [phone]. [place] Crisis Text Line: Text [place] to 441782 or Suicide LifeLine Chat: suicidepreventionlifeline.org/chat    Follow-up:   schedule an appointment with me or another program provider in approximately in 4 week(s) or sooner if needed.  Can speak with a staff member or call the appropriate program number (see below) to  schedule  Follow up with outpatient provider(s) as planned or sooner if needed for acute medical concerns.    Questions or concerns:  Call program line with questions or concerns (see below)  [employer] may be used to communicate with your provider, but this is not intended to be used for emergencies.    [facility] [place] Adult Mental Health Program lines:  [facility]: [phone]  Dual Disorder: [phone]  Adult Day Treatment:  [phone]  55+/Intensive Outpatient: [phone]    Community Resources:    National Suicide Prevention Lifeline: 988 from any phone, or 181-486-4089 (TTY: 981.448.1988). Call anytime for help.  (www.suicidepreventionlifeline.org)  National Boring on Mental Illness (www.shivani.org): 815.148.8186 or 230-156-3022.   Mental Health Association (www.mentalhealth.org): 461.915.5982 or 623-143-3077.  [place] Crisis Text Line: Text [place] to 304839  Suicide LifeLine Chat: suicideSame Day Servesline.org/chat    Treatment Objective(s) Addressed in This Session:  The purpose of today's virtual visit is for this writer to provide oversight of patient's care while receiving program services. Specific treatment goals addressed included personal safety, symptoms stabilization and management, wellness and mental health, and community resources/discharge planning.     This author or another program provider will follow up with the patient as noted above.     Patient agrees with the current plan of care.    Adin Hannon MD  2/07/24      Visit Details:  Type of service: In-person    Location (patient and provider): [facility] Adult Mental Health Outpatient Programmatic Care Offices    Level of Medical Decision Making:   - At least 1 chronic problem that is not stable  - Engaged in prescription drug management during visit (discussed any medication benefits, side effects, alternatives, etc.)  Discussion of management or test interpretation with external physician/other qualified healthcare  professional/appropriate source - multidisciplinary treatment team

## 2024-02-07 NOTE — GROUP NOTE
Psychotherapy Group Note    PATIENT'S NAME: Mariah Gaytan  MRN:   [id number]  :   1985  ACCT. NUMBER: [id number]  DATE OF SERVICE: 24  START TIME: 11:00 AM  END TIME: 11:50 AM  FACILITATOR: Bernice Mclain LICSW  TOPIC:  EBP Group: Self-Awareness  [facility] Mental Health Outpatient Programs  TRACK: IOP 1    NUMBER OF PARTICIPANTS: 7    Summary of Group / Topics Discussed:  Self-Awareness: Self-Compassion/Self-Esteem: Patients received overview of key concepts in developing self-compassion. Patients discussed mindfulness, self-kindness, and finding common humanity. Patients identified their current approach to problems in their lives and learned skills for increasing self-compassion. Patients identified ways they can put self-compassion skills into practice and problem solve barriers to application of skills.     Patient Session Goals / Objectives:  [place] components of self-compassion  Identify ways to practice self-compassion in daily life  Problem solve barriers to self-compassion practice      Patient Participation / Response:  Moderately participated, sharing some personal reflections / insights and adequately adequately received / provided feedback with other participants.    Demonstrated understanding of topics discussed through group discussion and participation and Identified plan to address barriers to practicing skills that promote self-awareness     Treatment Plan:  Patient has a current master individualized treatment plan.  See Epic treatment plan for more information.    CATARINA Gallego

## 2024-02-08 ENCOUNTER — HOSPITAL ENCOUNTER (OUTPATIENT)
Dept: BEHAVIORAL HEALTH | Facility: CLINIC | Age: 39
Discharge: HOME | End: 2024-02-08
Attending: PSYCHIATRY & NEUROLOGY
Payer: COMMERCIAL

## 2024-02-08 PROCEDURE — 90853 GROUP PSYCHOTHERAPY: CPT

## 2024-02-08 NOTE — GROUP NOTE
Psychotherapy Group Note    PATIENT'S NAME: Mariah Gaytan  MRN:   [id number]  :   1985  ACCT. NUMBER: [id number]  DATE OF SERVICE: 24  START TIME: 11:00 AM  END TIME: 11:50 AM  FACILITATOR: Bernice Mclain LICSW  TOPIC:  EBP Group: [facility] Adult Mental Health Outpatient Programs  TRACK: IOP 1    NUMBER OF PARTICIPANTS: 7    Summary of Group / Topics Discussed:  Mindfulness: Mindfulness Skills: Patients received information on the main components of mindfulness. Patients participated in discussion on how to practice observing, describing, and participating in internal and external environments. Relevance of mindfulness skills to overall mental and physical health was explored.  Patients explored and discussed in group their current awareness and knowledge of mindfulness skills as well as barriers to applying skills.    Patient Session Goals / Objectives:  Demonstrated and verbalized understanding of key mindfulness concepts  Identified when/how to use mindfulness skills  Resolved barriers to practicing mindfulness skills  Identified plan to use mindfulness skills in daily life       Patient Participation / Response:  Fully participated with the group by sharing personal reflections / insights and openly received / provided feedback with other participants.    Demonstrated understanding of topics discussed through group discussion and participation, Demonstrated understanding of mindfulness skills and benefits of practice, Identified / Expressed personal readiness to practice mindfulness skills, Verbalized understanding of how mindfulness can benefit mental health symptoms, Identified plan to address barriers to practicing mindfulness skills , and Practiced skills in session    Treatment Plan:  Patient has a current master individualized treatment plan.  See Epic treatment plan for more information.    CATARINA Gallego

## 2024-02-08 NOTE — GROUP NOTE
Psychoeducation Group Note    PATIENT'S NAME: Mariah Gaytan  MRN:   [id number]  :   1985  ACCT. NUMBER: [id number]  DATE OF SERVICE: 24  START TIME: 10:00 AM  END TIME: 10:50 AM  FACILITATOR: Katina Peraza LICSW  TOPIC: MH Wellness Group: Health Maintenance  [facility]  TRACK: IOP 1    NUMBER OF PARTICIPANTS: 7    Summary of Group / Topics Discussed:  Health Maintenance: Goal Setting: Meaningful goals can bring a sense of direction and purpose in life.  They also highlight our most important values. Patients were assisted by instructor to identify overall goals to promote their mental health recovery and improve overall health and wellness. Patients were educated on WOOP goals    Patient Session Goals / Objectives:  Explained the key concepts of WOOP goal setting framework  Identified primary goals to work on in IOP  Reviewed concept of balance in wellness as it pertains to goal setting        Patient Participation / Response:  Moderately participated, sharing some personal reflections / insights and adequately adequately received / provided feedback with other participants.    Demonstrated understanding of topics discussed through group discussion and participation and Identified / Expressed personal readiness to practice skills    Treatment Plan:  Patient has a current master individualized treatment plan.  See Epic treatment plan for more information.    CATARINA Chandler

## 2024-02-08 NOTE — GROUP NOTE
Process Group Note    PATIENT'S NAME: Mariah Gaytan  MRN:   [id number]  :   1985  ACCT. NUMBER: [id number]  DATE OF SERVICE: 24  START TIME:  9:00 AM  END TIME:  9:50 AM  FACILITATOR: Katina Peraza LICSW  TOPIC:  Process Group    Diagnoses:  Principal DSM5 Diagnoses  (Sustained by DSM5 Criteria Listed Above):   296.32 (F33.1) Major Depressive Disorder, Recurrent Episode, Moderate _ and With melancholic features.  3. Other Diagnoses that is relevant to services:   300.02 (F41.1) Generalized Anxiety Disorder.    [facility]  TRACK: IOP 1    NUMBER OF PARTICIPANTS: 7        Data:    Session content: At the start of this group, patients were invited to check in by identifying themselves, describing their current emotional status, and identifying issues to address in this group.   Area(s) of treatment focus addressed in this session included Symptom Management and Personal Safety.  Pt reports feeling irritated about the noise from the neighbors, despite the email she wrote them. She will [place] back again if needed. Pt was irritated by her brother and was assertive with him today. She sets a goal to call the dentist. Denies safety concerns.    Therapeutic Interventions/Treatment Strategies:  Psychotherapist offered support, feedback and validation and reinforced use of skills. Treatment modalities used include Cognitive Behavioral Therapy. Interventions include Relationship Skills: Encouraged development and maintenance  of healthy boundaries.    Assessment:    Patient response:   Patient responded to session by accepting feedback, giving feedback, listening, focusing on goals, being attentive, and accepting support    Possible barriers to participation / learning include: and no barriers identified    Health Issues:   None reported       Substance Use Review:   Substance Use: No active concerns identified.    Mental Status/Behavioral  Observations  Appearance:   Appropriate   Eye Contact:   Good   Psychomotor Behavior: Normal   Attitude:   Cooperative   Orientation:   All  Speech   Rate / Production: Normal    Volume:  Normal   Mood:    Angry  Anxious   Affect:    Worrisome   Thought Content:   Rumination  Thought Form:  Coherent  Logical     Insight:    Good     Plan:   Safety Plan: No current safety concerns identified.  Recommended that patient call 911 or go to the local ED should there be a change in any of these risk factors.   Barriers to treatment: None identified  Patient Contracts (see media tab):  None  Substance Use: Not addressed in session   Continue or Discharge: Patient will continue in Adult Day Treatment (ADT)  as planned. Patient is likely to benefit from learning and using skills as they work toward the goals identified in their treatment plan.      Katina Back, [facility]  February 8, 2024

## 2024-02-12 ENCOUNTER — HOSPITAL ENCOUNTER (OUTPATIENT)
Dept: BEHAVIORAL HEALTH | Facility: CLINIC | Age: 39
Discharge: HOME | End: 2024-02-12
Attending: PSYCHIATRY & NEUROLOGY
Payer: COMMERCIAL

## 2024-02-12 PROCEDURE — 90853 GROUP PSYCHOTHERAPY: CPT

## 2024-02-12 NOTE — GROUP NOTE
"Process Group Note    PATIENT'S NAME: Mariah Gaytan  MRN:   [id number]  :   1985  ACCT. NUMBER: [id number]  DATE OF SERVICE: 24  START TIME:  9:00 AM  END TIME:  9:50 AM  FACILITATOR: Katina Peraza LICSW  TOPIC:  Process Group    Diagnoses:  Principal DSM5 Diagnoses  (Sustained by DSM5 Criteria Listed Above):   296.32 (F33.1) Major Depressive Disorder, Recurrent Episode, Moderate _ and With melancholic features.  3. Other Diagnoses that is relevant to services:   300.02 (F41.1) Generalized Anxiety Disorder.    [facility]  TRACK: IOP 1    NUMBER OF PARTICIPANTS: 6        Data:    Session content: At the start of this group, patients were invited to check in by identifying themselves, describing their current emotional status, and identifying issues to address in this group.   Area(s) of treatment focus addressed in this session included Symptom Management and Personal Safety.  Pt reports feeling irritable today. She describes ongoing conflict with neighbors and feeling unsupported by the landlord. Pt is retaliating by making noise, pounding on the ceiling and breaking things. She is glad that her brother has agreed to join her in a meeting with the neighbors. Pt identifies coping skills as: self-care and assertiveness. She is looking forward to \"gal-entines day\" celebration with girlfriends tomorrow. Denies safety concerns.    Therapeutic Interventions/Treatment Strategies:  Psychotherapist offered support, feedback and validation and reinforced use of skills. Treatment modalities used include Cognitive Behavioral Therapy. Interventions include Coping Skills: Discussed use of self-soothe skills to decrease distress in the body and Relationship Skills: Assisted patients in implementing more effective communication skills in their relationships.    Assessment:    Patient response:   Patient responded to session by accepting feedback, giving feedback, " listening, focusing on goals, being attentive, and accepting support    Possible barriers to participation / learning include: and no barriers identified    Health Issues:   None reported       Substance Use Review:   Substance Use: No active concerns identified.    Mental Status/Behavioral Observations  Appearance:   Appropriate   Eye Contact:   Good   Psychomotor Behavior: Normal   Attitude:   Cooperative   Orientation:   All  Speech   Rate / Production: Normal    Volume:  Normal   Mood:    Angry  Anxious   Affect:    Worrisome   Thought Content:   Rumination  Thought Form:  Coherent  Obsessive     Insight:    Fair     Plan:   Safety Plan: No current safety concerns identified.  Recommended that patient call 911 or go to the local ED should there be a change in any of these risk factors.   Barriers to treatment: None identified  Patient Contracts (see media tab):  None  Substance Use: Not addressed in session   Continue or Discharge: Patient will continue in Adult Day Treatment (ADT)  as planned. Patient is likely to benefit from learning and using skills as they work toward the goals identified in their treatment plan.      Katina Back, [facility]  February 12, 2024

## 2024-02-12 NOTE — GROUP NOTE
Psychotherapy Group Note    PATIENT'S NAME: Mariah Gaytan  MRN:   [id number]  :   1985  ACCT. NUMBER: [id number]  DATE OF SERVICE: 24  START TIME: 10:00 AM  END TIME: 10:50 AM  FACILITATOR: Katina Peraza LICSW  TOPIC: MH EBP Group: Coping Skills  [facility] Out[facility]  TRACK: IOP 1    NUMBER OF PARTICIPANTS: 6    Summary of Group / Topics Discussed:  Self-Regulation Skills: Coping Cards: Provided education on the benefits of developing a robust coping plan to help manage distress and regulate emotions.  Discussed the importance of having easy access to this plan in times of increased symptoms.  Brainstormed with group members to identify coping skills in the categories of sensory strategies, distraction techniques, cognitive strategies, and physical activity.  Instructed patients to select 1-2 skills from each category to include in their deck of coping cards, then provided time and materials for patients to create this deck.  Prompted patients to consider where they might keep this tool and when they might find it most helpful.       Patient Session Goals / Objectives:   Review the benefits of having a robust coping plan to help manage distress and regulate emotions   Develop a deck of personal coping cards to promote self-regulation and independent skill use   Established a plan for practice of these skills in their own environments      Patient Participation / Response:  Fully participated with the group by sharing personal reflections / insights and openly received / provided feedback with other participants.    Demonstrated understanding of topics discussed through group discussion and participation, Expressed understanding of the relevance / importance of coping skills at distressing times in life, and Demonstrated knowledge of when to consider using a variety of coping skills in daily life    Treatment Plan:  Patient has a current master individualized  treatment plan.  See Epic treatment plan for more information.    Katina Back, [facility]

## 2024-02-12 NOTE — GROUP NOTE
Psychotherapy Group Note    PATIENT'S NAME: Mariah Gaytan  MRN:   [id number]  :   1985  ACCT. NUMBER: [id number]  DATE OF SERVICE: 24  START TIME: 11:00 AM  END TIME: 11:50 AM  FACILITATOR: Bernice Mclain LICSW  TOPIC: MH EBP Group: Relationship Skills  [facility]  TRACK: IOP 1    NUMBER OF PARTICIPANTS: 5    Summary of Group / Topics Discussed:  Relationship Skills: TYLOR/Assertive Communication: Patients were provided with a general overview of assertive communication skills and how practicing assertive communication skills will assist patients in developing healthier and more effective relationships. Patients reviewed their current awareness on ability to practice assertive communication, ways to increase assertive communication, and identified/problem solved barriers to assertive communication.     Patient Session Goals / Objectives:  Identified and discussed patient individual challenges with communication  Presented and practiced effective communication skills in session  Discussed TYLOR (DBT) using a Therapist Aid Handout  Discussed Assertiveness Bill of Rights   Assisted patients in implementing more effective communication skills in their relationships      Patient Participation / Response:  Fully participated with the group by sharing personal reflections / insights and openly received / provided feedback with other participants.    Demonstrated understanding of topics discussed through group discussion and participation, Demonstrated understanding of relationship skills and communication skills, Verbalized understanding of communication skills, communication challenges, and communication strengths, and Identified plan to address barriers to practicing relationship skills     Treatment Plan:  Patient has a current master individualized treatment plan.  See Epic treatment plan for more information.    CATARINA Gallego

## 2024-02-13 ENCOUNTER — HOSPITAL ENCOUNTER (OUTPATIENT)
Dept: BEHAVIORAL HEALTH | Facility: CLINIC | Age: 39
Discharge: HOME | End: 2024-02-13
Attending: PSYCHIATRY & NEUROLOGY
Payer: COMMERCIAL

## 2024-02-13 PROCEDURE — 90853 GROUP PSYCHOTHERAPY: CPT

## 2024-02-13 NOTE — GROUP NOTE
Psychotherapy Group Note    PATIENT'S NAME: Mariah Gaytan  MRN:   [id number]  :   1985  ACCT. NUMBER: [id number]  DATE OF SERVICE: 24  START TIME: 11:00 AM  END TIME: 11:50 AM  FACILITATOR: Bernice Mclain LICSW  TOPIC:  EBP Group: Self-Awareness  [facility]  TRACK: IOP 1    NUMBER OF PARTICIPANTS: 6    Summary of Group / Topics Discussed:  Self-Awareness: Self-Compassion: Patients received overview of key concepts in developing self-compassion. Patients discussed mindfulness, self-kindness, and finding common humanity. Patients identified their current approach to problems in their lives and learned skills for increasing self-compassion. Patients identified ways they can put self-compassion skills into practice and problem solve barriers to application of skills.     Patient Session Goals / Objectives:  [place] components of self-compassion  Identify ways to practice self-compassion in daily life  Problem solve barriers to self-compassion practice      Patient Participation / Response:  Fully participated with the group by sharing personal reflections / insights and openly received / provided feedback with other participants.    Demonstrated understanding of topics discussed through group discussion and participation, Demonstrated understanding of values, strengths, and challenges to learn about themselves, Identified / Expressed readiness to act intentionally, increase self-compassion, promote personal growth, Verbalized understanding of ways to proactively manage illness, and Identified plan to address barriers to practicing skills that promote self-awareness     Treatment Plan:  Patient has a current master individualized treatment plan.  See Epic treatment plan for more information.    CATARINA Gallego

## 2024-02-13 NOTE — GROUP NOTE
Process Group Note    PATIENT'S NAME: Mariah Gaytan  MRN:   [id number]  :   1985  ACCT. NUMBER: [id number]  DATE OF SERVICE: 24  START TIME:  9:00 AM  END TIME:  9:50 AM  FACILITATOR: Katina Peraza LICSW  TOPIC:  Process Group    Diagnoses:  Principal DSM5 Diagnoses  (Sustained by DSM5 Criteria Listed Above):   296.32 (F33.1) Major Depressive Disorder, Recurrent Episode, Moderate _ and With melancholic features.  3. Other Diagnoses that is relevant to services:   300.02 (F41.1) Generalized Anxiety Disorder.    [facility]  TRACK: IOP 1    NUMBER OF PARTICIPANTS: 6        Data:    Session content: At the start of this group, patients were invited to check in by identifying themselves, describing their current emotional status, and identifying issues to address in this group.   Area(s) of treatment focus addressed in this session included Symptom Management and Personal Safety.  Pt reports feeling unproductive yesterday, however enjoyed doing self-care activities/pampering. She continues to be angry about upstairs neighbors and will engage in the assertiveness discussion next hour. She is looking forward to dinner with friends tonight. She denies safety concerns.    Therapeutic Interventions/Treatment Strategies:  Psychotherapist offered support, feedback and validation and reinforced use of skills. Treatment modalities used include Cognitive Behavioral Therapy. Interventions include Coping Skills: Discussed use of self-soothe skills to decrease distress in the body and Relationship Skills: Assisted patients in implementing more effective communication skills in their relationships.    Assessment:    Patient response:   Patient responded to session by accepting feedback, giving feedback, listening, focusing on goals, being attentive, and accepting support    Possible barriers to participation / learning include: and no barriers identified    Health  Issues:   None reported       Substance Use Review:   Substance Use: No active concerns identified.    Mental Status/Behavioral Observations  Appearance:   Appropriate   Eye Contact:   Good   Psychomotor Behavior: Normal   Attitude:   Cooperative   Orientation:   All  Speech   Rate / Production: Normal    Volume:  Normal   Mood:    Anxious  Normal  Affect:    Appropriate   Thought Content:   Clear  Thought Form:  Coherent  Logical     Insight:    Good     Plan:   Safety Plan: No current safety concerns identified.  Recommended that patient call 911 or go to the local ED should there be a change in any of these risk factors.   Barriers to treatment: None identified  Patient Contracts (see media tab):  None  Substance Use: Not addressed in session   Continue or Discharge: Patient will continue in Adult Day Treatment (ADT)  as planned. Patient is likely to benefit from learning and using skills as they work toward the goals identified in their treatment plan.      Katina Back, [facility]  February 13, 2024

## 2024-02-13 NOTE — GROUP NOTE
Psychotherapy Group Note    PATIENT'S NAME: Mariah Gaytan  MRN:   [id number]  :   1985  ACCT. NUMBER: [id number]  DATE OF SERVICE: 24  START TIME: 10:00 AM  END TIME: 10:50 AM  FACILITATOR: Katina Peraza LICSW  TOPIC: MH EBP Group: Relationship Skills  [facility]  TRACK: IOP 1    NUMBER OF PARTICIPANTS: 6    Summary of Group / Topics Discussed:  Relationship Skills: Assertive Communication: Patients were provided with a general overview of assertive communication skills and how practicing assertive communication skills will assist patients in developing healthier and more effective relationships. Patients reviewed their current awareness on ability to practice assertive communication, ways to increase assertive communication, and identified/problem solved barriers to assertive communication.     Patient Session Goals / Objectives:  Identified and discussed patient individual challenges with communication  Presented and practiced effective communication skills in session  Assisted patients in implementing more effective communication skills in their relationships      Patient Participation / Response:  Moderately participated, sharing some personal reflections / insights and adequately adequately received / provided feedback with other participants.    Demonstrated understanding of topics discussed through group discussion and participation    Treatment Plan:  Patient has a current master individualized treatment plan.  See Epic treatment plan for more information.    CATARINA Chandler

## 2024-02-14 ENCOUNTER — HOSPITAL ENCOUNTER (OUTPATIENT)
Dept: BEHAVIORAL HEALTH | Facility: CLINIC | Age: 39
Discharge: HOME | End: 2024-02-14
Attending: PSYCHIATRY & NEUROLOGY
Payer: COMMERCIAL

## 2024-02-14 PROCEDURE — 90853 GROUP PSYCHOTHERAPY: CPT

## 2024-02-14 NOTE — GROUP NOTE
Psychotherapy Group Note    PATIENT'S NAME: Mariah Gaytan  MRN:   [id number]  :   1985  ACCT. NUMBER: [id number]  DATE OF SERVICE: 24  START TIME: 10:00 AM  END TIME: 10:50 AM  FACILITATOR: Katina Peraza LICSW  TOPIC: MH EBP Group: Relationship Skills  [facility]  TRACK: IOP 1    NUMBER OF PARTICIPANTS: 5    Summary of Group / Topics Discussed:  Relationship Skills: Boundaries: Patients were provided with a general overview of interpersonal boundaries and how lack of boundaries relates to symptoms and functioning. The purpose is to help patients identify boundary issues and gain awareness and skills to work towards healthier interpersonal boundaries. Current awareness of healthy boundary characteristics and barriers to establishing healthy boundaries were discussed.    Patient Session Goals / Objectives:  Familiarized patients with the concept of interpersonal boundaries and their characteristics  Discussed and practiced strategies to promote healthier interpersonal boundaries  Identified boundary issues and identified plan to improve boundaries      Patient Participation / Response:  Fully participated with the group by sharing personal reflections / insights and openly received / provided feedback with other participants.    Demonstrated understanding of topics discussed through group discussion and participation, Demonstrated understanding of relationship skills and communication skills, and Identified / Expressed personal readiness to incorporate effective communication skills    Treatment Plan:  Patient has a current master individualized treatment plan.  See Epic treatment plan for more information.    CATARINA Chandler

## 2024-02-14 NOTE — GROUP NOTE
Psychotherapy Group Note    PATIENT'S NAME: Mariah Gaytan  MRN:   [id number]  :   1985  ACCT. NUMBER: [id number]  DATE OF SERVICE: 24  START TIME: 11:00 AM  END TIME: 11:50 AM  FACILITATOR: Bernice Mclain LICSW  TOPIC:  EBP Group: Self-Awareness  [facility]  TRACK: IOP 1    NUMBER OF PARTICIPANTS: 4    Summary of Group / Topics Discussed:  Self-Awareness: Self-Compassion (Nurturing Inner Child): Patients received background on Jim Angelo's concept of inner child as a way to understand current symptoms and stressors.  Patients identified their current approach to problems in their lives and learned skills for increasing self-compassion. Patients identified ways they can put self-compassion skills into practice and problem solve barriers to application of skills.     Patient Session Goals / Objectives:  Discussed childhood experiences with play and brainstormed ways to incorporate play in adulthood  Discussed psychological benefits of prioritizing play  Brainstormed other ways of promoting inner child healing      Patient Participation / Response:  Fully participated with the group by sharing personal reflections / insights and openly received / provided feedback with other participants.    Demonstrated understanding of topics discussed through group discussion and participation, Demonstrated understanding of values, strengths, and challenges to learn about themselves, Identified / Expressed readiness to act intentionally, increase self-compassion, promote personal growth, Verbalized understanding of ways to proactively manage illness, and Identified plan to address barriers to practicing skills that promote self-awareness     Treatment Plan:  Patient has a current master individualized treatment plan.  See Epic treatment plan for more information.    CATARINA Gallego

## 2024-02-14 NOTE — GROUP NOTE
Process Group Note    PATIENT'S NAME: Mariah Gaytan  MRN:   [id number]  :   1985  ACCT. NUMBER: [id number]  DATE OF SERVICE: 24  START TIME:  9:00 AM  END TIME:  9:50 AM  FACILITATOR: Katina Peraza LICSW  TOPIC:  Process Group    Diagnoses:  Principal DSM5 Diagnoses  (Sustained by DSM5 Criteria Listed Above):   296.32 (F33.1) Major Depressive Disorder, Recurrent Episode, Moderate _ and With melancholic features.  3. Other Diagnoses that is relevant to services:   300.02 (F41.1) Generalized Anxiety Disorder.    [facility]  TRACK: IOP 1    NUMBER OF PARTICIPANTS: 5        Data:    Session content: At the start of this group, patients were invited to check in by identifying themselves, describing their current emotional status, and identifying issues to address in this group.   Area(s) of treatment focus addressed in this session included Symptom Management and Personal Safety.  Pt reports feeling ok today. She enjoyed socializing with friends last night. Pt identifies coping skills as: leisure and humor. She sets goals to go to yoga and explore the occupational therapy field. Pt denies safety concerns.    Therapeutic Interventions/Treatment Strategies:  Psychotherapist offered support, feedback and validation and reinforced use of skills. Treatment modalities used include Cognitive Behavioral Therapy. Interventions include Coping Skills: Assisted patient in understanding the purpose of planning / creating / participating / sharing in positive experiences.    Assessment:    Patient response:   Patient responded to session by accepting feedback, giving feedback, listening, focusing on goals, being attentive, and accepting support    Possible barriers to participation / learning include: and no barriers identified    Health Issues:   None reported       Substance Use Review:   Substance Use: alcohol .  and Last use: couple drinks last night    Mental  Status/Behavioral Observations  Appearance:   Appropriate   Eye Contact:   Good   Psychomotor Behavior: Normal   Attitude:   Cooperative   Orientation:   All  Speech   Rate / Production: Normal    Volume:  Normal   Mood:    Normal  Affect:    Appropriate   Thought Content:   Clear  Thought Form:  Coherent  Logical     Insight:    Good     Plan:   Safety Plan: No current safety concerns identified.  Recommended that patient call 911 or go to the local ED should there be a change in any of these risk factors.   Barriers to treatment: None identified  Patient Contracts (see media tab):  None  Substance Use: Not addressed in session   Continue or Discharge: Patient will continue in Adult Day Treatment (ADT)  as planned. Patient is likely to benefit from learning and using skills as they work toward the goals identified in their treatment plan.      Katina Back, [facility]  February 14, 2024

## 2024-02-15 ENCOUNTER — HOSPITAL ENCOUNTER (OUTPATIENT)
Dept: BEHAVIORAL HEALTH | Facility: CLINIC | Age: 39
Discharge: HOME | End: 2024-02-15
Attending: PSYCHIATRY & NEUROLOGY
Payer: COMMERCIAL

## 2024-02-15 PROCEDURE — 90853 GROUP PSYCHOTHERAPY: CPT

## 2024-02-15 NOTE — GROUP NOTE
Psychotherapy Group Note    PATIENT'S NAME: Mariah Gaytan  MRN:   [id number]  :   1985  ACCT. NUMBER: [id number]  DATE OF SERVICE: 2/15/24  START TIME: 10:00 AM  END TIME: 10:50 AM  FACILITATOR: Katina Peraza LICSW  TOPIC: MH EBP Group: Relationship Skills  [facility]  TRACK: IOP 1    NUMBER OF PARTICIPANTS: 6    Summary of Group / Topics Discussed:  Relationship Skills: Boundaries part 2: Patients were provided with a general overview of interpersonal boundaries and how lack of boundaries relates to symptoms and functioning. The purpose is to help patients identify boundary issues and gain awareness and skills to work towards healthier interpersonal boundaries. Current awareness of healthy boundary characteristics and barriers to establishing healthy boundaries were discussed.    Patient Session Goals / Objectives:  Familiarized patients with the concept of interpersonal boundaries and their characteristics  Discussed and practiced strategies to promote healthier interpersonal boundaries  Identified boundary issues and identified plan to improve boundaries      Patient Participation / Response:  Fully participated with the group by sharing personal reflections / insights and openly received / provided feedback with other participants.    Demonstrated understanding of topics discussed through group discussion and participation and Demonstrated understanding of relationship skills and communication skills    Treatment Plan:  Patient has a current master individualized treatment plan.  See Epic treatment plan for more information.    CATARINA Chandler

## 2024-02-15 NOTE — GROUP NOTE
"Process Group Note    PATIENT'S NAME: Mariah Gaytan  MRN:   [id number]  :   1985  ACCT. NUMBER: [id number]  DATE OF SERVICE: 2/15/24  START TIME:  9:00 AM  END TIME:  9:50 AM  FACILITATOR: Katina Peraza LICSW  TOPIC:  Process Group    Diagnoses:  Principal DSM5 Diagnoses  (Sustained by DSM5 Criteria Listed Above):   296.32 (F33.1) Major Depressive Disorder, Recurrent Episode, Moderate _ and With melancholic features.  3. Other Diagnoses that is relevant to services:   300.02 (F41.1) Generalized Anxiety Disorder.    [facility] Outpatient Programs  TRACK: IOP 1    NUMBER OF PARTICIPANTS: 5        Data:    Session content: At the start of this group, patients were invited to check in by identifying themselves, describing their current emotional status, and identifying issues to address in this group.   Area(s) of treatment focus addressed in this session included Symptom Management and Personal Safety.  Pt reports feeling \"ok, but anxious about orthodontist appointment today\". Pt is worried about the cost of possible visible braces, but will follow through with the appointment. Pt identifies coping skills as: leisure, self-care and comedy. She is proud of going to yoga and identifies the benefit. Pt is grateful for group and denies safety concerns.    Therapeutic Interventions/Treatment Strategies:  Psychotherapist offered support, feedback and validation and reinforced use of skills. Treatment modalities used include Cognitive Behavioral Therapy. Interventions include Behavioral Activation: Explored how behaviors effect mood and interact with thoughts and feelings.    Assessment:    Patient response:   Patient responded to session by accepting feedback, giving feedback, listening, focusing on goals, being attentive, and accepting support    Possible barriers to participation / learning include: and no barriers identified    Health Issues:   None reported       Substance Use " Review:   Substance Use: No active concerns identified.    Mental Status/Behavioral Observations  Appearance:   Appropriate   Eye Contact:   Good   Psychomotor Behavior: Normal   Attitude:   Cooperative   Orientation:   All  Speech   Rate / Production: Normal    Volume:  Normal   Mood:    Anxious  Normal  Affect:    Appropriate   Thought Content:   Clear  Thought Form:  Coherent  Logical     Insight:    Good     Plan:   Safety Plan: No current safety concerns identified.  Recommended that patient call 911 or go to the local ED should there be a change in any of these risk factors.   Barriers to treatment: None identified  Patient Contracts (see media tab):  None  Substance Use: Not addressed in session   Continue or Discharge: Patient will continue in Adult Day Treatment (ADT)  as planned. Patient is likely to benefit from learning and using skills as they work toward the goals identified in their treatment plan.      Katina Back, LICSW  February 15, 2024

## 2024-02-15 NOTE — GROUP NOTE
Psychoeducation Group Note    PATIENT'S NAME: Mariah Gaytan  MRN:   [id number]  :   1985  ACCT. NUMBER: [id number]  DATE OF SERVICE: 2/15/24  START TIME: 11:00 AM  END TIME: 11:50 AM  FACILITATOR: Bernice Mclain LICSW  TOPIC: MH Wellness Group: Health Maintenance  [facility]  TRACK: IOP 1    NUMBER OF PARTICIPANTS: 6    Summary of Group / Topics Discussed:  Health Maintenance: Weekend planning: Patients were given time to complete a weekend plan of what they will do to promote wellness and sobriety over the weekend when they do not have the structure of group. Patients were encouraged to review progress on their treatment goals and were challenged to identify ways to work toward meeting them. Patients identified and discussed foreseeable barriers to success over the weekend and then developed a plan to overcome them. Patients reviewed their distress coping skills and social support network and discussed this with the group.       Patient Session Goals / Objectives:    ?    Identified activities to engage in that promote balance in wellness  ?    Distinguished possible barriers to success over the weekend and created a plan to overcome them  ?    Listed distress coping skills and identified social support network to utilize if in crisis during the weekend        Patient Participation / Response:  Fully participated with the group by sharing personal reflections / insights and openly received / provided feedback with other participants.    Demonstrated understanding of topics discussed through group discussion and participation, Identified / Expressed personal readiness to practice skills, and Verbalized understanding of health maintenance topic    Treatment Plan:  Patient has a current master individualized treatment plan.  See Epic treatment plan for more information.    CATARINA Gallego

## 2024-02-19 ENCOUNTER — HOSPITAL ENCOUNTER (OUTPATIENT)
Dept: BEHAVIORAL HEALTH | Facility: CLINIC | Age: 39
Discharge: HOME | End: 2024-02-19
Attending: PSYCHIATRY & NEUROLOGY
Payer: COMMERCIAL

## 2024-02-19 PROCEDURE — 90853 GROUP PSYCHOTHERAPY: CPT | Performed by: COUNSELOR

## 2024-02-19 PROCEDURE — 90853 GROUP PSYCHOTHERAPY: CPT

## 2024-02-19 NOTE — GROUP NOTE
Psychotherapy Group Note    PATIENT'S NAME: Mariah Gaytan  MRN:   [id number]  :   1985  ACCT. NUMBER: [id number]  DATE OF SERVICE: 24  START TIME: 10:00 AM  END TIME: 10:50 AM  FACILITATOR: Katina Peraza LICSW  TOPIC: MH EBP Group: Coping Skills  [facility]  TRACK: IOP 1    NUMBER OF PARTICIPANTS: 5    Summary of Group / Topics Discussed:  Coping Skills: Additional Coping Skills:  Patients discussed and practiced TIPP Skills.  Reviewed the benefits of applying the aforementioned coping strategies.  Patients explored how these strategies might be applied to daily stressors or distressing situations.    Patient Session Goals / Objectives:  Understand the purpose and benefits of applying TIPP coping strategies  Identify ways to use TIPP when increasingly anxious, angry and/or trying to avoid SIB or urges to use  Address barriers to utilizing coping skills when in distress.      Patient Participation / Response:  Fully participated with the group by sharing personal reflections / insights and openly received / provided feedback with other participants.    Demonstrated understanding of topics discussed through group discussion and participation and Expressed understanding of the relevance / importance of coping skills at distressing times in life    Treatment Plan:  Patient has a current master individualized treatment plan.  See Epic treatment plan for more information.    CATARINA Chandler
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
## 2024-02-19 NOTE — GROUP NOTE
Psychotherapy Group Note    PATIENT'S NAME: Mariah Gaytan  MRN:   [id number]  :   1985  ACCT. NUMBER: [id number]  DATE OF SERVICE: 24  START TIME: 11:00 AM  END TIME: 11:50 AM  FACILITATOR: Junito Murillo LMFT  TOPIC:  EBP Group: Coping Skills  [facility]  TRACK: IOP1    NUMBER OF PARTICIPANTS: 5    Summary of Group / Topics Discussed:  Coping Skills: Additional Coping Skills:  Willingness vs. Willfulness: Patients learned about practicing willingness over willfulness to be effective in the moment.  Patients identified situations that necessitate willingness over willfulness  Patients discussed how to distinguish when this can be useful in their lives and when other skills are relevant or helpful.    Patient Session Goals / Objectives:  Understand causes of willfulness for each of us in our lives  Process the difficulty of acceptance in our lives and benefits of willingness over willfulness to mood and functioning.  Demonstrate understanding of when to use willingness over willfulness by providing examples of situations where this could be applied.  Identify barriers to applying willingness over willfulness to difficult situations and explore strategies to overcome them      Patient Participation / Response:  Fully participated with the group by sharing personal reflections / insights and openly received / provided feedback with other participants.    Demonstrated understanding of topics discussed through group discussion and participation, Expressed understanding of the relevance / importance of coping skills at distressing times in life, Demonstrated knowledge of when to consider using a variety of coping skills in daily life, and Practiced 2-3 new coping skills in session    Treatment Plan:  Patient has a current master individualized treatment plan.  See Epic treatment plan for more information.    VICKY Ryder

## 2024-02-20 ENCOUNTER — HOSPITAL ENCOUNTER (OUTPATIENT)
Dept: BEHAVIORAL HEALTH | Facility: CLINIC | Age: 39
Discharge: HOME | End: 2024-02-20
Attending: PSYCHIATRY & NEUROLOGY
Payer: COMMERCIAL

## 2024-02-20 ENCOUNTER — TELEPHONE (OUTPATIENT)
Dept: BEHAVIORAL HEALTH | Facility: CLINIC | Age: 39
End: 2024-02-20
Payer: COMMERCIAL

## 2024-02-20 PROCEDURE — 90853 GROUP PSYCHOTHERAPY: CPT | Performed by: COUNSELOR

## 2024-02-20 PROCEDURE — 90853 GROUP PSYCHOTHERAPY: CPT

## 2024-02-20 NOTE — GROUP NOTE
"Process Group Note    PATIENT'S NAME: Mariah Gaytan  MRN:   [id number]  :   1985  ACCT. NUMBER: [id number]  DATE OF SERVICE: 24  START TIME:  9:00 AM  END TIME:  9:50 AM  FACILITATOR: Katina Peraza LICSW  TOPIC:  Process Group    Diagnoses:  Principal DSM5 Diagnoses  (Sustained by DSM5 Criteria Listed Above):   296.32 (F33.1) Major Depressive Disorder, Recurrent Episode, Moderate _ and With melancholic features.  3. Other Diagnoses that is relevant to services:   300.02 (F41.1) Generalized Anxiety Disorder.    [facility] Out[facility]  TRACK: IOP 1    NUMBER OF PARTICIPANTS: 6        Data:    Session content: At the start of this group, patients were invited to check in by identifying themselves, describing their current emotional status, and identifying issues to address in this group.   Area(s) of treatment focus addressed in this session included Symptom Management and Personal Safety.  Pt reports feeling tired today. She states \"yoga really kicked my butt\". Pt cites the benefits of yoga. She identifies self-compassion skills as \"catching negative thoughts\". Pt sets a goal to apply for a  job. Denies safety concerns.    Therapeutic Interventions/Treatment Strategies:  Psychotherapist offered support, feedback and validation and reinforced use of skills. Treatment modalities used include Cognitive Behavioral Therapy. Interventions include Behavioral Activation: Explored how behaviors effect mood and interact with thoughts and feelings and Emotions Management:  Reviewed opposite action skill.    Assessment:    Patient response:   Patient responded to session by accepting feedback, giving feedback, listening, focusing on goals, being attentive, and accepting support    Possible barriers to participation / learning include: and no barriers identified    Health Issues:   None reported       Substance Use Review:   Substance Use: No active concerns " identified.    Mental Status/Behavioral Observations  Appearance:   Appropriate   Eye Contact:   Good   Psychomotor Behavior: Normal   Attitude:   Cooperative   Orientation:   All  Speech   Rate / Production: Normal    Volume:  Normal   Mood:    Anxious   Affect:    Subdued   Thought Content:   Clear  Thought Form:  Coherent  Logical     Insight:    Good     Plan:   Safety Plan: No current safety concerns identified.  Recommended that patient call 911 or go to the local ED should there be a change in any of these risk factors.   Barriers to treatment: None identified  Patient Contracts (see media tab):  None  Substance Use: Not addressed in session   Continue or Discharge: Patient will continue in Adult Day Treatment (ADT)  as planned. Patient is likely to benefit from learning and using skills as they work toward the goals identified in their treatment plan.      Katina Back, LICSW  February 20, 2024

## 2024-02-20 NOTE — GROUP NOTE
Psychotherapy Group Note    PATIENT'S NAME: Mariah Gaytan  MRN:   [id number]  :   1985  ACCT. NUMBER: [id number]  DATE OF SERVICE: 24  START TIME: 10:00 AM  END TIME: 10:50 AM  FACILITATOR: Katina Peraza LICSW  TOPIC: MH EBP Group: Self-Awareness  [facility] Out[facility]  TRACK: IOP 1    NUMBER OF PARTICIPANTS: 6    Summary of Group / Topics Discussed:  Self-Awareness: Gratitude: Topic focused on assisting patients in identifying key concepts in gratitude. Patients discussed the benefits of practicing gratitude and its impact on mood improvement, mindfulness, and perspective. Patients worked to increase time spent on recognition and appreciation of what is positive and working in their lives. Patients discussed the concepts and benefits of feeling grateful. The goal is to reduce rumination and negative thinking resulting in increased mindfulness and resilience. Patients specifically discussed how they can practice and problem solve barriers to daily gratitude practice.     Patient Session Goals / Objectives:  [place] the concept and benefits of gratitude  Identified ways to practice gratitude in daily life  Problem solved barriers to practicing gratitude      Patient Participation / Response:  Moderately participated, sharing some personal reflections / insights and adequately adequately received / provided feedback with other participants.    Demonstrated understanding of topics discussed through group discussion and participation    Treatment Plan:  Patient has a current master individualized treatment plan.  See Epic treatment plan for more information.    CATARINA Chandler

## 2024-02-20 NOTE — GROUP NOTE
Psychotherapy Group Note    PATIENT'S NAME: Mariah Gaytan  MRN:   [id number]  :   1985  ACCT. NUMBER: [id number]  DATE OF SERVICE: 24  START TIME: 11:00 AM  END TIME: 11:50 AM  FACILITATOR: Palma Kendrick LPCC  TOPIC: MH EBP Group: Emotions Management  [facility]  TRACK: IOP1    NUMBER OF PARTICIPANTS: 5    Summary of Group / Topics Discussed:  Emotions Management: Model of Emotions: Patients were introduced to the cyclical model of emotions.  Explored emotions are shaped by different events and one s interpretation of events.  Group discussed how emotions begin with an event, followed by one s interpretation, followed by associated feelings.  Discussion included a review of personal urges and actions that can/do follow an emotional experience in the patient s life, and the end results.    Patient Session Goals / Objectives:  Demonstrate understanding of types various emotions.  Identify and discuss specific emotions and when they occur; understand triggers.  Identify individual emotions and physical sensations that accompany them.  Discuss urges and actions, and how to influence the intensity of emotional reactions and disrupt the cycle.    Discuss barriers to emotional regulation.  Choose 1-2 strategies to assist with emotional response to potentially distressing situations.      Patient Participation / Response:  Fully participated with the group by sharing personal reflections / insights and openly received / provided feedback with other participants.    Demonstrated understanding of topics discussed through group discussion and participation, Expressed understanding of the relevance / importance of emotions management skills at distressing times in life, Self-aware of experiences with difficult emotions, and strategies to employ to manage them, Demonstrated knowledge of when to consider applying a variety of emotions management skills in daily  life, Demonstrated understanding and practice strategies to manage difficult emotions and move towards healing, Identified barriers to applying emotions management strategies, Practiced 2-3 new skills in session, and Identified / Expressed personal readiness to practice new emotions management skills    Treatment Plan:  Patient has a current master individualized treatment plan.  See Epic treatment plan for more information.    Palma Kendrick, LPCC

## 2024-02-20 NOTE — TELEPHONE ENCOUNTER
----- Message from Dru Og sent at 2/20/2024 12:37 PM CST -----  Regarding: Add More Appts  Hi [facility] team,     Please add more appts, as many as auth if possible. Thanks    Location of programming: [facility]   Group: Adult MH IOP 1   Attending Provider: Adin Hannon   Duration of Appointment in minutes: 180 minutes   Visit Type: Treatment [870]

## 2024-02-21 ENCOUNTER — HOSPITAL ENCOUNTER (OUTPATIENT)
Dept: BEHAVIORAL HEALTH | Facility: CLINIC | Age: 39
Discharge: HOME | End: 2024-02-21
Attending: PSYCHIATRY & NEUROLOGY
Payer: COMMERCIAL

## 2024-02-21 PROCEDURE — 90853 GROUP PSYCHOTHERAPY: CPT | Performed by: SOCIAL WORKER

## 2024-02-21 PROCEDURE — 90853 GROUP PSYCHOTHERAPY: CPT
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
## 2024-02-21 NOTE — GROUP NOTE
Psychotherapy Group Note    PATIENT'S NAME: Mariah Gaytan  MRN:   [id number]  :   1985  ACCT. NUMBER: [id number]  DATE OF SERVICE: 24  START TIME: 10:00 AM  END TIME: 10:50 AM  FACILITATOR: Katina Peraza LICSW  TOPIC: MH EBP Group: Self-Awareness  [facility] Mental Health Outpatient Programs  TRACK: IOP 1    NUMBER OF PARTICIPANTS: 6    Summary of Group / Topics Discussed:  Self-Awareness: Values: Patients identified personal values by examining development of their current values and how their values influence their daily functioning and life choices. Patients explored the impact of their values on their thoughts, feelings, and actions. Patients discussed definition of personal values and how they develop and change over time. The goal is to help patients reconcile value conflicts and achieve balance and flexibility to improve mood and daily functioning.     Patient Session Goals / Objectives:  Examined development of values and impact of values on functioning  Identified and prioritized important values related to current well-being   Identified strategies to change or enhance values to positively impact symptoms  Assisted patients to find ways to adapt functioning to better fit their values      Patient Participation / Response:  Fully participated with the group by sharing personal reflections / insights and openly received / provided feedback with other participants.    Demonstrated understanding of topics discussed through group discussion and participation, Demonstrated understanding of values, strengths, and challenges to learn about themselves, and Practiced skills in session    Treatment Plan:  Patient has a current master individualized treatment plan.  See Epic treatment plan for more information.    CATARINA Chandler

## 2024-02-22 ENCOUNTER — HOSPITAL ENCOUNTER (OUTPATIENT)
Dept: BEHAVIORAL HEALTH | Facility: CLINIC | Age: 39
Discharge: HOME | End: 2024-02-22
Attending: PSYCHIATRY & NEUROLOGY
Payer: COMMERCIAL

## 2024-02-22 PROCEDURE — 90853 GROUP PSYCHOTHERAPY: CPT | Performed by: SOCIAL WORKER

## 2024-02-22 PROCEDURE — 90853 GROUP PSYCHOTHERAPY: CPT

## 2024-02-22 NOTE — GROUP NOTE
Psychoeducation Group Note    PATIENT'S NAME: Mariah Gaytan  MRN:   [id number]  :   1985  ACCT. NUMBER: [id number]  DATE OF SERVICE: 24  START TIME: 10:00 AM  END TIME: 10:50 AM  FACILITATOR: Katina Peraza LICSW  TOPIC: MH Wellness Group: Health Maintenance  [facility] Adult Mental Health Outpatient Programs  TRACK: IOP 1    NUMBER OF PARTICIPANTS: 6    Summary of Group / Topics Discussed:  Health Maintenance: Weekend planning: Patients were given time to complete a weekend plan of what they will do to promote wellness and sobriety over the weekend when they do not have the structure of group. Patients were encouraged to review progress on their treatment goals and were challenged to identify ways to work toward meeting them. Patients identified and discussed foreseeable barriers to success over the weekend and then developed a plan to overcome them. Patients reviewed their distress coping skills and social support network and discussed this with the group.       Patient Session Goals / Objectives:    ?    Identified activities to engage in that promote balance in wellness  ?    Distinguished possible barriers to success over the weekend and created a plan to overcome them  ?    Listed distress coping skills and identified social support network to utilize if in crisis during the weekend        Patient Participation / Response:  Moderately participated, sharing some personal reflections / insights and adequately adequately received / provided feedback with other participants.    Demonstrated understanding of topics discussed through group discussion and participation    Treatment Plan:  Patient has a current master individualized treatment plan.  See Epic treatment plan for more information.    CATARINA Chandler

## 2024-02-22 NOTE — GROUP NOTE
Psychotherapy Group Note    PATIENT'S NAME: Mariah Gaytan  MRN:   [id number]  :   1985  ACCT. NUMBER: [id number]  DATE OF SERVICE: 24  START TIME: 11:00 AM  END TIME: 11:50 AM  FACILITATOR: Carina Ny LICSW  TOPIC: MH EBP Group: Symptom Awareness  [facility] Mental Health Outpatient Programs  TRACK: IOP 1    NUMBER OF PARTICIPANTS: 5    Summary of Group / Topics Discussed:  Symptom Awareness: Symptom Observation and Tracking: An overview of symptom observation and tracking was presented to help patients identify specific symptoms and identify patterns. This topic will also assist patient in identifying progress towards goal of decreasing severity of symptoms and increasing overall functioning. Patients completed a symptom check list in session. Patient was assisted in identifying baseline functioning, patterns, and ways to assess current symptoms. Patient was also assisted in identifying a tool or strategy to continue to track or monitor symptoms over a period of time.       Patient Session Goals / Objectives:  Identified patient individual symptoms and experiences  Identified potential symptom patterns and factors that contribute to changes in symptom severity      Patient Participation / Response:  Fully participated with the group by sharing personal reflections / insights and openly received / provided feedback with other participants.    Demonstrated understanding of topics discussed through group discussion and participation and Practiced skills in session    Treatment Plan:  Patient has a current master individualized treatment plan.  See Epic treatment plan for more information.    CATARINA Gambino

## 2024-02-22 NOTE — GROUP NOTE
Psychotherapy Group Note    PATIENT'S NAME: Mariah Gaytan  MRN:   [id number]  :   1985  ACCT. NUMBER: [id number]  DATE OF SERVICE: 24  START TIME: 11:00 AM  END TIME: 11:50 AM  FACILITATOR: Shanell Aldana LICSW  TOPIC: MH EBP Group: Coping Skills  [facility] Mental Health Outpatient Programs  TRACK: IOP1    NUMBER OF PARTICIPANTS: 5    Summary of Group / Topics Discussed:  Coping Skills: Relapse Planning: Patients discussed and increased understanding of how anticipating and planning for possible increased symptoms is a proactive way to reduce the likelihood of relapse.  Patients shared individualized factors that may lead to increased symptoms and decompensation in functioning.  Each patient developed a relapse prevention plan designed to help them recognize when they may have increasing symptoms requiring them to take action and notify their key supports and care team.      Patient Session Goals / Objectives:  Identify and understand what factors may contribute to symptom relapse.  Identify actions that may be taken to manage increased symptoms/stressors.  Create an individualized written relapse plan  Problem solve barriers to plan creation and implementation  Share relapse plan with key support people      Patient Participation / Response:  Fully participated with the group by sharing personal reflections / insights and openly received / provided feedback with other participants.    Demonstrated knowledge of when to consider using a variety of coping skills in daily life and Practiced 2-3 new coping skills in session    Treatment Plan:  Patient has a current master individualized treatment plan.  See Epic treatment plan for more information.    CATARINA Dean

## 2024-02-22 NOTE — GROUP NOTE
"Process Group Note    PATIENT'S NAME: Mariah Gaytan  MRN:   [id number]  :   1985  ACCT. NUMBER: [id number]  DATE OF SERVICE: 24  START TIME:  9:00 AM  END TIME:  9:50 AM  FACILITATOR: Katina Peraza LICSW  TOPIC:  Process Group    Diagnoses:  Principal DSM5 Diagnoses  (Sustained by DSM5 Criteria Listed Above):   296.32 (F33.1) Major Depressive Disorder, Recurrent Episode, Moderate _ and With melancholic features.  3. Other Diagnoses that is relevant to services:   300.02 (F41.1) Generalized Anxiety Disorder.    [facility] Mental Health Outpatient Programs  TRACK: IOP 1    NUMBER OF PARTICIPANTS: 6        Data:    Session content: At the start of this group, patients were invited to check in by identifying themselves, describing their current emotional status, and identifying issues to address in this group.   Area(s) of treatment focus addressed in this session included Symptom Management and Personal Safety.  Pt reports feeling \"tired and frustrated\". She reports a \"bad day yesterday after leaving group\". Pt is upset about upstairs neighbor noise and feels angry at them. Patient feels a little better about the situation, due to landlord's offer to break the lease. She will consider moving. Pt is also upset that she cannot find a loan to cover dental work needed. We discussed options. She identifies coping skill as a lavender shower. Denies safety concerns.    Therapeutic Interventions/Treatment Strategies:  Psychotherapist offered support, feedback and validation and reinforced use of skills. Treatment modalities used include Cognitive Behavioral Therapy. Interventions include Coping Skills: Discussed use of self-soothe skills to decrease distress in the body, Other: Discussed ways to increase hopefulness, and Relationship Skills: Encouraged development and maintenance  of healthy boundaries.    Assessment:    Patient response:   Patient responded to session by accepting feedback, " giving feedback, listening, focusing on goals, being attentive, and accepting support    Possible barriers to participation / learning include: and no barriers identified    Health Issues:   None reported       Substance Use Review:   Substance Use: cannabis .     Mental Status/Behavioral Observations  Appearance:   Appropriate   Eye Contact:   Good   Psychomotor Behavior: Restless   Attitude:   Cooperative   Orientation:   All  Speech   Rate / Production: Normal    Volume:  Normal   Mood:    Angry  Anxious  Agitated  Affect:    Worrisome   Thought Content:   Clear  Thought Form:  Coherent  Obsessive     Insight:    Good     Plan:   Safety Plan: No current safety concerns identified.  Recommended that patient call 911 or go to the local ED should there be a change in any of these risk factors.   Barriers to treatment: None identified  Patient Contracts (see media tab):  None  Substance Use: Not addressed in session   Continue or Discharge: Patient will continue in Adult Day Treatment (ADT)  as planned. Patient is likely to benefit from learning and using skills as they work toward the goals identified in their treatment plan.      Katina Back, LICSW  February 22, 2024

## 2024-02-26 ENCOUNTER — HOSPITAL ENCOUNTER (OUTPATIENT)
Dept: BEHAVIORAL HEALTH | Facility: CLINIC | Age: 39
Discharge: HOME | End: 2024-02-26
Attending: PSYCHIATRY & NEUROLOGY
Payer: COMMERCIAL

## 2024-02-26 PROCEDURE — 90853 GROUP PSYCHOTHERAPY: CPT

## 2024-02-26 PROCEDURE — 90853 GROUP PSYCHOTHERAPY: CPT | Performed by: COUNSELOR

## 2024-02-26 NOTE — GROUP NOTE
Psychotherapy Group Note    PATIENT'S NAME: Mariah Gaytan  MRN:   [id number]  :   1985  ACCT. NUMBER: [id number]  DATE OF SERVICE: 24  START TIME: 10:00 AM  END TIME: 10:50 AM  FACILITATOR: Katina Peraza LICSW  TOPIC: MH EBP Group: Coping Skills  [facility] Adult Mental Health Outpatient Programs  TRACK: IOP 1    NUMBER OF PARTICIPANTS: 7    Summary of Group / Topics Discussed:  Coping Skills: Building Positive Experiences: Patients discussed the importance of planning and engaging in positive experiences, as strategies to increase positive thinking, hope, and self-worth.  Explored the benefits of planning / creating positive experiences, including recognizing and reducing negativity bias by focusing on and building positive experiences.   Several approaches to building positive experiences were presented and discussed relevant to each patient.      Patient Session Goals / Objectives:  Understand the purpose of planning / creating / participating / sharing in positive experiences.  Explore patient s experiences related to negative thinking and how it influences activities and moodIdentify current positive events in patient s life.   Set goals to increase a variety of positive experiences.  Address barriers to planning / engaging in positive experiences.      Patient Participation / Response:  Fully participated with the group by sharing personal reflections / insights and openly received / provided feedback with other participants.    Demonstrated understanding of topics discussed through group discussion and participation and Expressed understanding of the relevance / importance of coping skills at distressing times in life    Treatment Plan:  Patient has a current master individualized treatment plan.  See Epic treatment plan for more information.    CATARINA Chandler

## 2024-02-26 NOTE — GROUP NOTE
"Process Group Note    PATIENT'S NAME: Mariah Gaytan  MRN:   [id number]  :   1985  ACCT. NUMBER: [id number]  DATE OF SERVICE: 24  START TIME:  9:00 AM  END TIME:  9:50 AM  FACILITATOR: Katina Peraza LICSW  TOPIC:  Process Group    Diagnoses:  Principal DSM5 Diagnoses  (Sustained by DSM5 Criteria Listed Above):   296.32 (F33.1) Major Depressive Disorder, Recurrent Episode, Moderate _ and With melancholic features.  3. Other Diagnoses that is relevant to services:   300.02 (F41.1) Generalized Anxiety Disorder.    [facility] Mental Health Outpatient Programs  TRACK: IOP 1    NUMBER OF PARTICIPANTS: 7        Data:    Session content: At the start of this group, patients were invited to check in by identifying themselves, describing their current emotional status, and identifying issues to address in this group.   Area(s) of treatment focus addressed in this session included Symptom Management and Personal Safety.  Pt reports feeling \"a little anxious\" today. She learned that she may have to pay back insurance payments to her company, if she resigns. Pt will explore further. She identifies coping skills as using positive self-talk. She enjoyed time with a former co-worker this wknd. Denies safety concerns.    Therapeutic Interventions/Treatment Strategies:  Psychotherapist offered support, feedback and validation and reinforced use of skills. Treatment modalities used include Cognitive Behavioral Therapy. Interventions include Behavioral Activation: Explored how behaviors effect mood and interact with thoughts and feelings.    Assessment:    Patient response:   Patient responded to session by accepting feedback, giving feedback, listening, focusing on goals, being attentive, and accepting support    Possible barriers to participation / learning include: and no barriers identified    Health Issues:   None reported       Substance Use Review:   Substance Use: No active concerns " identified.    Mental Status/Behavioral Observations  Appearance:   Appropriate   Eye Contact:   Good   Psychomotor Behavior: Normal   Attitude:   Cooperative   Orientation:   All  Speech   Rate / Production: Normal    Volume:  Normal   Mood:    Anxious  Normal  Affect:    Appropriate   Thought Content:   Clear  Thought Form:  Coherent  Logical     Insight:    Good     Plan:   Safety Plan: No current safety concerns identified.  Recommended that patient call 911 or go to the local ED should there be a change in any of these risk factors.   Barriers to treatment: None identified  Patient Contracts (see media tab):  None  Substance Use: Not addressed in session   Continue or Discharge: Patient will continue in Adult Day Treatment (ADT)  as planned. Patient is likely to benefit from learning and using skills as they work toward the goals identified in their treatment plan.      Katina Back, LICSW  February 26, 2024

## 2024-02-27 ENCOUNTER — HOSPITAL ENCOUNTER (OUTPATIENT)
Dept: BEHAVIORAL HEALTH | Facility: CLINIC | Age: 39
Discharge: HOME | End: 2024-02-27
Attending: PSYCHIATRY & NEUROLOGY
Payer: COMMERCIAL

## 2024-02-27 PROCEDURE — 90853 GROUP PSYCHOTHERAPY: CPT | Performed by: COUNSELOR

## 2024-02-27 PROCEDURE — 90853 GROUP PSYCHOTHERAPY: CPT

## 2024-02-27 NOTE — GROUP NOTE
Psychotherapy Group Note    PATIENT'S NAME: Mariah Gaytan  MRN:   [id number]  :   1985  [id number]  DATE OF SERVICE: 24  START TIME: 11:00 AM  END TIME: 11:50 AM  FACILITATOR: Junito Murillo LMFT  TOPIC:  EBP Group: Cognitive Restructuring  [facility] Mental Health Outpatient Programs  TRACK: IOP1    NUMBER OF PARTICIPANTS: 7    Summary of Group / Topics Discussed:  Cognitive Restructuring: Introduction and Overview: Patients were introduced to Cognitive Behavioral Therapy (CBT) as a way to identify ineffective thought patterns, understand factors that contribute to ineffective thought patterns, gain awareness of the impact of those thoughts on emotions and behavior, and learn methods for modifying thinking to achieve better mood regulation. Patients received a general overview of how ones thoughts influence feelings and behaviors in the context of CBT. Patients explored the development of their own thought patterns and how they impact daily functioning.      Patient Session Goals / Objectives:  Familiarize self with the interrelationship of thoughts, feelings and behavior.  Identify ineffective / unhelpful thought patterns and their impact on mood.             Patient Participation / Response:  Minimally participated, only when prompted / asked.    Demonstrated understanding of topics discussed through group discussion and participation    Treatment Plan:  Patient has a current master individualized treatment plan.  See Epic treatment plan for more information.    VICKY Ryder

## 2024-02-27 NOTE — GROUP NOTE
"Process Group Note    PATIENT'S NAME: Mariah Gaytan  MRN:   [id number]  :   1985  ACCT. NUMBER: [id number]  DATE OF SERVICE: 24  START TIME:  9:00 AM  END TIME:  9:50 AM  FACILITATOR: Katina Peraza LICSW  TOPIC:  Process Group    Diagnoses:  Principal DSM5 Diagnoses  (Sustained by DSM5 Criteria Listed Above):   296.32 (F33.1) Major Depressive Disorder, Recurrent Episode, Moderate _ and With melancholic features.  3. Other Diagnoses that is relevant to services:   300.02 (F41.1) Generalized Anxiety Disorder.    [facility] Mental Health Outpatient Programs  TRACK: IOP 1    NUMBER OF PARTICIPANTS: 6        Data:    Session content: At the start of this group, patients were invited to check in by identifying themselves, describing their current emotional status, and identifying issues to address in this group.   Area(s) of treatment focus addressed in this session included Symptom Management and Personal Safety.  Pt reports feeling \"overwhelmed, stressed and anxious\". She is worried about being discharged from the program to soon. She received an email from her boss and is scared to read it. She feels that she may be forced to return to work due to insurance costs. Pt agreed to meet with this writer tomorrow to discuss options. Pt identifies coping skills as deep breathing and yoga. Denies safety concerns.    Therapeutic Interventions/Treatment Strategies:  Psychotherapist offered support, feedback and validation, provided redirection, and reinforced use of skills. Treatment modalities used include Cognitive Behavioral Therapy. Interventions include Behavioral Activation: Explored how behaviors effect mood and interact with thoughts and feelings and Relationship Skills: Encouraged development and maintenance  of healthy boundaries.    Assessment:    Patient response:   Patient responded to session by accepting feedback, giving feedback, listening, focusing on goals, being attentive, and " accepting support    Possible barriers to participation / learning include: and no barriers identified    Health Issues:   None reported       Substance Use Review:   Substance Use: No active concerns identified.    Mental Status/Behavioral Observations  Appearance:   Appropriate   Eye Contact:   Good   Psychomotor Behavior: Normal   Attitude:   Cooperative   Orientation:   All  Speech   Rate / Production: Normal    Volume:  Normal   Mood:    Anxious   Affect:    Worrisome   Thought Content:   Rumination  Thought Form:  Coherent  Logical     Insight:    Good     Plan:   Safety Plan: No current safety concerns identified.  Recommended that patient call 911 or go to the local ED should there be a change in any of these risk factors.   Barriers to treatment: None identified  Patient Contracts (see media tab):  None  Substance Use: Not addressed in session   Continue or Discharge: Patient will continue in Adult Day Treatment (ADT)  as planned. Patient is likely to benefit from learning and using skills as they work toward the goals identified in their treatment plan.      Katina Back, [facility]  February 27, 2024

## 2024-02-27 NOTE — GROUP NOTE
Psychotherapy Group Note    PATIENT'S NAME: Mariah Gaytan  MRN:   [id number]  :   1985  ACCT. NUMBER: [id number]  DATE OF SERVICE: 24  START TIME: 11:00 AM  END TIME: 11:50 AM  FACILITATOR: Palma Kendrick LPCC  TOPIC:  EBP Group: Coping Skills  [facility]  TRACK: IOP1    NUMBER OF PARTICIPANTS: 6    Summary of Group / Topics Discussed:  Coping Skills: Radical Acceptance: Patients learned radical acceptance as a way to tolerate heightened stress in the moment.  Patients identified situations that necessitate radical acceptance.  They focused on applying acceptance of the moment to tolerate difficult emotions and events. Patients discussed how to distinguish when this can be useful in their lives and when other skills are more relevant or helpful.    Patient Session Goals / Objectives:  Understand that some amount of pain exists for each of us in our lives  Process the difficulty of acceptance in our lives and benefits of radical acceptance to mood and functioning.  Demonstrate understanding of when to use the radical acceptance to tolerate distress by providing examples of situations where this could be applied.  Identify barriers to applying radical acceptance to difficult situations and explore strategies to overcome them      Patient Participation / Response:  Fully participated with the group by sharing personal reflections / insights and openly received / provided feedback with other participants.    Demonstrated understanding of topics discussed through group discussion and participation, Expressed understanding of the relevance / importance of coping skills at distressing times in life, Demonstrated knowledge of when to consider using a variety of coping skills in daily life, Identified barriers to applying coping skills, Identified 2-3 positive coping strategies that have helped maintain / improve symptoms in the past, Practiced 2-3 new  coping skills in session, and Identified / Expressed personal readiness to practice new coping skills    Treatment Plan:  Patient has a current master individualized treatment plan.  See Epic treatment plan for more information.    Palma Kendrick, ARLENEC

## 2024-02-27 NOTE — GROUP NOTE
Psychotherapy Group Note    PATIENT'S NAME: Mariah Gaytan  MRN:   [id number]  :   1985  ACCT. NUMBER: [id number]  DATE OF SERVICE: 24  START TIME: 10:00 AM  END TIME: 10:50 AM  FACILITATOR: Katina Peraza LICSW  TOPIC: MH EBP Group: Cognitive Restructuring  [facility]  TRACK: IOP 1    NUMBER OF PARTICIPANTS: 6    Summary of Group / Topics Discussed:  Cognitive Restructuring: Distortions: Patients received an overview of how to identify common cognitive distortions. Patients will explore alternatives to cognitive distortions and practice challenging their negative thought patterns. The goal is to help patients target modify ineffective thought patterns.     Patient Session Goals / Objectives:  Familiarized self with ineffective / unhealthy thoughts and how they develop.    Explored impact of ineffective thoughts / distortions on mood and activity  Formulated new neutral/positive alternatives to challenge less helpful / ineffective thoughts.  Practiced and plan to apply in daily life             Patient Participation / Response:  Fully participated with the group by sharing personal reflections / insights and openly received / provided feedback with other participants.    Demonstrated understanding of topics discussed through group discussion and participation, Expressed understanding of the relationship between behaviors, thoughts, and feelings, and Demonstrated knowledge of personal thought patterns and how they impact their mood and behavior.    Treatment Plan:  Patient has a current master individualized treatment plan.  See Epic treatment plan for more information.    CATARINA Chandler

## 2024-02-28 ENCOUNTER — HOSPITAL ENCOUNTER (OUTPATIENT)
Dept: BEHAVIORAL HEALTH | Facility: CLINIC | Age: 39
Discharge: HOME | End: 2024-02-28
Attending: PSYCHIATRY & NEUROLOGY
Payer: COMMERCIAL

## 2024-02-28 PROCEDURE — 90853 GROUP PSYCHOTHERAPY: CPT

## 2024-02-28 NOTE — GROUP NOTE
"Process Group Note    PATIENT'S NAME: Mariah Gaytan  MRN:   [id number]  :   1985  ACCT. NUMBER: [id number]  DATE OF SERVICE: 24  START TIME:  9:00 AM  END TIME:  9:50 AM  FACILITATOR: Katina Peraza LICSW  TOPIC:  Process Group    Diagnoses:  Principal DSM5 Diagnoses  (Sustained by DSM5 Criteria Listed Above):   296.32 (F33.1) Major Depressive Disorder, Recurrent Episode, Moderate _ and With melancholic features.  3. Other Diagnoses that is relevant to services:   300.02 (F41.1) Generalized Anxiety Disorder.    [facility]  TRACK: IOP 1    NUMBER OF PARTICIPANTS: 6        Data:    Session content: At the start of this group, patients were invited to check in by identifying themselves, describing their current emotional status, and identifying issues to address in this group.   Area(s) of treatment focus addressed in this session included Symptom Management, Personal Safety, and Community Resources/Discharge Planning.  Pt reports feeling \"a little anxious, but ok\". She is worried about needing to pay back her employer for health insurance, if she resigns. She will call both [employer] and HR today. Pt is uncertain what her next career steps will be. 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 Pt is upset that her brother may get back together with his ex. Pt describes a negative situation for him and an inability to reason with him. She identifies coping skills as: humor and self-compassion. Denies safety concerns.    Therapeutic Interventions/Treatment Strategies:  Psychotherapist offered support, feedback and validation, provided redirection, and reinforced use of skills. Treatment modalities used include Cognitive Behavioral Therapy. Interventions include Behavioral Activation: Explored how behaviors effect mood and interact with thoughts and feelings, Coping Skills: Facilitated discussion on learning and " applying radical acceptance skill and Discussed use of self-soothe skills to decrease distress in the body, and Relationship Skills: Assisted patients in implementing more effective communication skills in their relationships and Encouraged development and maintenance  of healthy boundaries.    Assessment:    Patient response:   Patient responded to session by accepting feedback, giving feedback, listening, focusing on goals, being attentive, and accepting support    Possible barriers to participation / learning include: and no barriers identified    Health Issues:   None reported       Substance Use Review:   Substance Use: No active concerns identified.    Mental Status/Behavioral Observations  Appearance:   Appropriate   Eye Contact:   Good   Psychomotor Behavior: Normal   Attitude:   Cooperative   Orientation:   All  Speech   Rate / Production: Normal    Volume:  Normal   Mood:    Angry  Anxious  Depressed  Fearful Agitated  Affect:    Worrisome   Thought Content:   Rumination  Thought Form:  Coherent     Insight:    Good     Plan:   Safety Plan: No current safety concerns identified.  Recommended that patient call 911 or go to the local ED should there be a change in any of these risk factors.   Barriers to treatment: None identified  Patient Contracts (see media tab):  None  Substance Use: Not addressed in session   Continue or Discharge: Patient will continue in Adult Day Treatment (ADT)  as planned. Patient is likely to benefit from learning and using skills as they work toward the goals identified in their treatment plan.      Katina Back, LICSW  February 28, 2024

## 2024-02-28 NOTE — GROUP NOTE
Psychotherapy Group Note    PATIENT'S NAME: Mariah Gaytan  MRN:   [id number]  :   1985  ACCT. NUMBER: [id number]  DATE OF SERVICE: 24  START TIME: 11:00 AM  END TIME: 11:50 AM  FACILITATOR: Katina Peraza LICSW  TOPIC: MH EBP Group: Self-Awareness  [facility]  TRACK: IOP 1    NUMBER OF PARTICIPANTS: 6    Summary of Group / Topics Discussed:  Self-Awareness: Self-Compassion: Patients received overview of key concepts in developing self-compassion. Patients discussed mindfulness, self-kindness, and finding common humanity. Patients identified their current approach to problems in their lives and learned skills for increasing self-compassion. Patients identified ways they can put self-compassion skills into practice and problem solve barriers to application of skills.     Patient Session Goals / Objectives:  [place] components of self-compassion  Identify ways to practice self-compassion in daily life  Problem solve barriers to self-compassion practice      Patient Participation / Response:  Fully participated with the group by sharing personal reflections / insights and openly received / provided feedback with other participants.    Demonstrated understanding of topics discussed through group discussion and participation, Demonstrated understanding of values, strengths, and challenges to learn about themselves, and Identified / Expressed readiness to act intentionally, increase self-compassion, promote personal growth    Treatment Plan:  Patient has a current master individualized treatment plan.  See Epic treatment plan for more information.    CATARINA Chandler

## 2024-02-28 NOTE — GROUP NOTE
Psychotherapy Group Note    PATIENT'S NAME: Mariah Gaytan  MRN:   [id number]  :   1985  ACCT. NUMBER: [id number]  DATE OF SERVICE: 24  START TIME: 10:00 AM  END TIME: 10:50 AM  FACILITATOR: Katina Peraza LICSW  TOPIC: MH EBP Group: Cognitive Restructuring  [facility]  TRACK: IOP 1    NUMBER OF PARTICIPANTS: 6    Summary of Group / Topics Discussed:  Cognitive Restructuring: Distortions part 2: Patients received an overview of how to identify common cognitive distortions. Patients will explore alternatives to cognitive distortions and practice challenging their negative thought patterns. The goal is to help patients target modify ineffective thought patterns.     Patient Session Goals / Objectives:  Familiarized self with ineffective / unhealthy thoughts and how they develop.    Explored impact of ineffective thoughts / distortions on mood and activity  Formulated new neutral/positive alternatives to challenge less helpful / ineffective thoughts.  Practiced and plan to apply in daily life             Patient Participation / Response:  Fully participated with the group by sharing personal reflections / insights and openly received / provided feedback with other participants.    Demonstrated understanding of topics discussed through group discussion and participation and Expressed understanding of the relationship between behaviors, thoughts, and feelings    Treatment Plan:  Patient has a current master individualized treatment plan.  See Epic treatment plan for more information.    CATARINA Chandler

## 2024-02-29 ENCOUNTER — HOSPITAL ENCOUNTER (OUTPATIENT)
Dept: BEHAVIORAL HEALTH | Facility: CLINIC | Age: 39
Discharge: HOME | End: 2024-02-29
Attending: PSYCHIATRY & NEUROLOGY
Payer: COMMERCIAL

## 2024-02-29 PROCEDURE — 90853 GROUP PSYCHOTHERAPY: CPT | Performed by: COUNSELOR

## 2024-02-29 PROCEDURE — 90853 GROUP PSYCHOTHERAPY: CPT

## 2024-02-29 NOTE — GROUP NOTE
Psychoeducation Group Note    PATIENT'S NAME: Mariah Gaytan  MRN:   [id number]  :   1985  ACCT. NUMBER: [id number]  DATE OF SERVICE: 24  START TIME: 10:00 AM  END TIME: 10:50 AM  FACILITATOR: Katina Peraza LICSW  TOPIC: MH Wellness Group: Health Maintenance  [facility]  TRACK: IOP 1    NUMBER OF PARTICIPANTS: 7    Summary of Group / Topics Discussed:  Health Maintenance: Discharge planning/Community resources: Patients worked on completing an instructor-facilitated discharge planning activity. Discharge planning begins for all patients after admission. Competent discharge planning promotes a successful transition and decreases the likelihood of mental health relapse. In this group, all dimensions of wellness were reviewed to assess for needs/discharge readiness. These dimensions included: physical, emotional, occupational/productivity, environmental, social, spiritual, intellectual, and financial. Patients worked on completing/updating their discharge planning and identifying their treatment needs prior to time of discharge.     Patient Session Goals / Objectives:  Identified unmet treatment needs to accomplish before discharge  Completed all dimensions of the discharge planning packet  Participated in the planning process, make phone calls, set up appointments, got connected with community resources, followed up with treatment team as needed         Patient Participation / Response:  Moderately participated, sharing some personal reflections / insights and adequately adequately received / provided feedback with other participants.    Demonstrated understanding of topics discussed through group discussion and participation    Treatment Plan:  Patient has a current master individualized treatment plan.  See Epic treatment plan for more information.    CATARINA Chandler

## 2024-02-29 NOTE — GROUP NOTE
"Process Group Note    PATIENT'S NAME: Mariah Gaytan  MRN:   [id number]  :   1985  ACCT. NUMBER: [id number]  DATE OF SERVICE: 24  START TIME:  9:00 AM  END TIME:  9:50 AM  FACILITATOR: Katina Peraza LICSW  TOPIC:  Process Group    Diagnoses:  Principal DSM5 Diagnoses  (Sustained by DSM5 Criteria Listed Above):   296.32 (F33.1) Major Depressive Disorder, Recurrent Episode, Moderate _ and With melancholic features.  3. Other Diagnoses that is relevant to services:   300.02 (F41.1) Generalized Anxiety Disorder.    [facility]  TRACK: IOP 1    NUMBER OF PARTICIPANTS: 7        Data:    Session content: At the start of this group, patients were invited to check in by identifying themselves, describing their current emotional status, and identifying issues to address in this group.   Area(s) of treatment focus addressed in this session included Symptom Management and Personal Safety.  Pt reports feeling \"super tired and aggravated\". She is upset that she cannot find information about the possible costs of quitting her job. Pt is also upset about big physical therapy bill and inability to get clarification. Discussed ways to proceed with both of these situations. Pt identifies coping skills as humor, yoga and catching negative thoughts. Denies safety concerns.    Therapeutic Interventions/Treatment Strategies:  Psychotherapist offered support, feedback and validation, provided redirection, and reinforced use of skills. Treatment modalities used include Cognitive Behavioral Therapy. Interventions include Behavioral Activation: Explored how behaviors effect mood and interact with thoughts and feelings and Relationship Skills: Assisted patients in implementing more effective communication skills in their relationships.    Assessment:    Patient response:   Patient responded to session by accepting feedback, giving feedback, listening, focusing on goals, being " attentive, and accepting support    Possible barriers to participation / learning include: and no barriers identified    Health Issues:   None reported       Substance Use Review:   Substance Use: No active concerns identified.    Mental Status/Behavioral Observations  Appearance:   Appropriate   Eye Contact:   Fair   Psychomotor Behavior: Normal   Attitude:   Cooperative   Orientation:   All  Speech   Rate / Production: Normal    Volume:  Normal   Mood:    Angry  Anxious  Irritable   Affect:    Worrisome   Thought Content:   Rumination  Thought Form:  Coherent  Obsessive     Insight:    Fair     Plan:   Safety Plan: No current safety concerns identified.  Recommended that patient call 911 or go to the local ED should there be a change in any of these risk factors.   Barriers to treatment: None identified  Patient Contracts (see media tab):  None  Substance Use: Not addressed in session   Continue or Discharge: Patient will continue in Adult Day Treatment (ADT)  as planned. Patient is likely to benefit from learning and using skills as they work toward the goals identified in their treatment plan.      Katina Back, [facility]  February 29, 2024

## 2024-02-29 NOTE — GROUP NOTE
Psychotherapy Group Note    PATIENT'S NAME: Mariah Gaytan  MRN:   [id number]  :   1985  ACCT. NUMBER: [id number]  DATE OF SERVICE: 24  START TIME: 11:00 AM  END TIME: 11:50 AM  FACILITATOR: Junito Murillo LMFT  TOPIC:  EBP Group: [facility]  TRACK: IOP1    NUMBER OF PARTICIPANTS: 7    Summary of Group / Topics Discussed:  Mindfulness: What is Mindfulness: Patients received an overview on what mindfulness is and how mindfulness can benefit general health, mental health symptoms, and stressors. The history of mindfulness, its application to mental health therapies, and key concepts were also discussed. Patients discussed current awareness, knowledge, and practice of mindfulness skills. Patients also discussed barriers to mindfulness practice.     Patient Session Goals / Objectives:  Demonstrated understanding of key concepts and application to daily life  Identified when/how to use mindfulness   Resolved barriers to practice  Identified plan to use mindfulness in daily life      Patient Participation / Response:  Moderately participated, sharing some personal reflections / insights and adequately adequately received / provided feedback with other participants.    Demonstrated understanding of topics discussed through group discussion and participation, Demonstrated understanding of mindfulness skills and benefits of practice, Verbalized understanding of how mindfulness can benefit mental health symptoms, and Practiced skills in session    Treatment Plan:  Patient has a current master individualized treatment plan.  See Epic treatment plan for more information.    VICKY Ryder

## 2024-03-04 ENCOUNTER — HOSPITAL ENCOUNTER (OUTPATIENT)
Dept: BEHAVIORAL HEALTH | Facility: CLINIC | Age: 39
Discharge: HOME | End: 2024-03-04
Attending: PSYCHIATRY & NEUROLOGY
Payer: COMMERCIAL

## 2024-03-04 DIAGNOSIS — F41.1 GENERALIZED ANXIETY DISORDER: ICD-10-CM

## 2024-03-04 DIAGNOSIS — F33.1 MAJOR DEPRESSIVE DISORDER, RECURRENT EPISODE, MODERATE (H): Primary | ICD-10-CM

## 2024-03-04 PROCEDURE — 99214 OFFICE O/P EST MOD 30 MIN: CPT | Performed by: PSYCHIATRY & NEUROLOGY

## 2024-03-04 PROCEDURE — 90853 GROUP PSYCHOTHERAPY: CPT

## 2024-03-04 NOTE — GROUP NOTE
Psychotherapy Group Note    PATIENT'S NAME: Mariah Gaytan  MRN:   [id number]  :   1985  ACCT. NUMBER: [id number]  DATE OF SERVICE: 3/04/24  START TIME: 10:00 AM  END TIME: 10:50 AM  FACILITATOR: Katina Peraza LICSW  TOPIC: MH EBP Group: Coping Skills  [facility]  TRACK: IOP 1    NUMBER OF PARTICIPANTS: 7    Summary of Group / Topics Discussed:  Coping Skills: Additional Coping Skills:  Patients discussed the benefits of laughter and discussed ways to increase humor.  Reviewed the benefits of applying the aforementioned coping strategies.  Patients explored how these strategies might be applied to daily stressors or distressing situations.    Patient Session Goals / Objectives:  Understand the purpose and benefits of applying humor coping strategies  Identified ways to increase laughter  Address barriers to utilizing coping skills when in distress.      Patient Participation / Response:  Fully participated with the group by sharing personal reflections / insights and openly received / provided feedback with other participants.    Demonstrated understanding of topics discussed through group discussion and participation, Expressed understanding of the relevance / importance of coping skills at distressing times in life, and Demonstrated knowledge of when to consider using a variety of coping skills in daily life    Treatment Plan:  Patient has a current master individualized treatment plan.  See Epic treatment plan for more information.    CATARINA Chandler

## 2024-03-04 NOTE — PROGRESS NOTES
"[facility]   Adult Mental Health Outpatient Programs  Psychiatric Progress Record    Program: Intensive Outpatient Program, track 1    PATIENT'S NAME: Mariah Gaytan  MRN:   [id number]  :   1985  ACCT. NUMBER: [id number]  DATE OF SERVICE: 3/04/24    Interval History:  \"I'm doing okay.\" Mariah presents today for follow-up and ongoing program supervision.   Endorses:  Has resigned from job  \"That feels good but scary\"  Applying to new positions in a different field  Still looking into becoming an OT  \"Money's kind of a stressor, and all the unknowns\"  Feels ready to discharge from [facility] as planned later this week  Continues to see individual therapist  Seeing outpatient psychiatric provider later this week  May explore ADHD testing/treatment     Symptoms/Systems:  Sleep: \"still a struggle to get to bed early\"  Still working on sleep hygiene  Appetite: \"good'  Daily function/ADLs: \"it's good'  Hygiene: no concerns  Socialization: no concerns  Concerns about work or ability to work: see above    Substance use:  \"Could probably be a little bit better\"  \"Minor cannabis use a couple times per week,\" and \"a little alcohol\"  No issues as a result of substance use    Reactions/thoughts about program:  \"It's been good to be here but I'm ready to graduate\"    Safety Assessment:  Suicidal ideation: \"some hopelessness every once in a while, not too frequently I guess\"  Denies more specific suicidal ideation  Thoughts of non-suicidal self-injury: denied  Recent self-injurious behavior: denied  Homicidal ideation: denied  Other safety concerns: denied    Medications:  Current Outpatient Medications   Medication Sig Dispense Refill    biotin 1000 MCG TABS tablet Take 1,000 mcg by mouth daily      buPROPion HCl ER, XL, 450 MG TB24 Take 450 mg by mouth daily      busPIRone HCl (BUSPAR) 30 MG tablet Take 30 mg by mouth 2 times daily      cetirizine (ZYRTEC) 5 MG tablet Take 5 mg by mouth daily      clonazePAM (KLONOPIN) 0.5 " MG ODT Take 0.5 mg by mouth 2 times daily as needed for anxiety      levonorgestrel (MIRENA) 52 MG (20 mcg/day) IUD by Intrauterine route once      multivitamin, therapeutic (THERA-VIT) TABS tablet Take 1 tablet by mouth daily      spironolactone (ALDACTONE) 100 MG tablet Take 100 mg by mouth daily         The above list was reviewed and updated in EPIC with patient today.     Patient is taking medications as prescribed and denies adverse effects    Laboratory Results:  Most recent labs reviewed. Pertinent updates/findings: None.     Metrics:  PHQ-9 scores:       12/27/2023    10:00 AM 1/3/2024     4:21 PM 1/4/2024     9:48 AM   PHQ-9 SCORE   PHQ-9 Total Score MyChart  9 (Mild depression)    PHQ-9 Total Score 13 9    9 9       KURT-7 scores:       12/27/2023    10:00 AM 1/4/2024     9:48 AM   KURT-7 SCORE   Total Score 9 7       CSSR-S: [place] Suicide Severity Rating Scale (Short Version)      12/27/2023    10:00 AM 1/4/2024     9:00 AM   [place] Suicide Severity Rating (Short Version)   Q1 Wished to be Dead (Past Month) yes yes   Q2 Suicidal Thoughts (Past Month) yes no   Q3 Suicidal Thought Method no    Q4 Suicidal Intent without Specific Plan no    Q5 Suicide Intent with Specific Plan no    Q6 Suicide Behavior (Lifetime) no    Level of Risk per Screen low risk          Mental Status Examination:  Vital Signs: There were no vitals taken for this visit.   Appearance: appropriately groomed, appears stated age, and in no apparent distress.  Attitude: cooperative   Eye Contact: good   Muscle Strength and Tone: no gross abnormalities   Psychomotor Behavior: normal or unremarkable   Gait and Station: deferred  Speech: normal rate, production, volume, and rhythm of, decreased prosody  Associations: No loosening of associations  Thought Process: coherent and goal directed  Thought Content: no evidence of suicidal ideation or homicidal ideation, no evidence of psychotic thought, no auditory hallucinations present, and no  "visual hallucinations present  Mood: \"better\"  Affect: mood congruent, intensity is blunted, constricted mobility, and reactive  Insight: good  Judgment: intact, adequate for safety  Impulse Control: intact  Oriented to: time, place, person, and situation  Attention Span and Concentration: normal  Language: Intact  Recent and Remote Memory: intact to interview. Not formally assessed. No amnesia.  Fund of Knowledge/Assessment of Intelligence: Average  Capacity of Activities of Daily Living: Independent, able to participate in programmatic care services.    Diagnosis/es:    ICD-10-CM    1. Major depressive disorder, recurrent episode, moderate (H)  F33.1       2. Generalized anxiety disorder  F41.1           Assessment/Plan:  Mariah presents today for follow up. Endorses continued improvement as a result of programming. Reaching maximum benefit at this level of care and will be ready to discharge later this week as planned. No changes to medication today; defer to outpatient provider. Continue outpatient psychotherapy following discharge.    Continue therapy as planned:  Enrolled in IOP  Reaching maximum benefit at this level of care, anticipate discharge this week    Safety plan reviewed:  To the Emergency Department as needed or call after hours crisis line at [phone] or [phone]. [place] Crisis Text Line: Text [place] to 675772 or Suicide LifeLine Chat: suicidepreventionlifeline.org/chat    Follow-up:   Follow up with outpatient provider(s) as planned or sooner if needed for acute medical concerns.    Questions or concerns:  Call program line with questions or concerns (see below)  MyChart may be used to communicate with your provider, but this is not intended to be used for emergencies.    [facility] Adult Mental Health Program lines:  [facility] Hospital: [phone]  Dual Disorder: [phone]  Adult Day Treatment:  [phone]  55+/Intensive Outpatient: [phone]    Community Resources:  "   National Suicide Prevention Lifeline: 988 from any phone, or 511-151-9260 (TTY: 686.748.3759). Call anytime for help.  (www.suicidepreventionlifeline.org)  National [place] on Mental Illness (www.shivani.org): 330.178.1165 or 979-427-2626.   Mental Health Association (www.mentalhealth.org): 672.747.7497 or 505-809-6655.  [place] Crisis Text Line: Text [place] to 718673  Suicide LifeLine Chat: suicideRent.com.org/chat    Treatment Objective(s) Addressed in This Session:  The purpose of today's virtual visit is for this writer to provide oversight of patient's care while receiving program services. Specific treatment goals addressed included personal safety, symptoms stabilization and management, wellness and mental health, and community resources/discharge planning.     This author or another program provider will follow up with the patient as noted above.     Patient agrees with the current plan of care.    Adin Hannon MD  3/04/24      Visit Details:  Type of service: In-person    Location (patient and provider): [facility] Adult Mental Health Outpatient Programmatic Care Offices    Level of Medical Decision Making:   - At least 1 chronic problem that is not stable  - Engaged in prescription drug management during visit (discussed any medication benefits, side effects, alternatives, etc.)  Discussion of management or test interpretation with external physician/other qualified healthcare professional/appropriate source - IOP treatment team

## 2024-03-04 NOTE — GROUP NOTE
Psychotherapy Group Note    PATIENT'S NAME: Mariah Gaytan  MRN:   [id number]  :   1985  ACCT. NUMBER: [id number]  DATE OF SERVICE: 3/04/24  START TIME: 11:00 AM  END TIME: 11:50 AM  FACILITATOR: Bernice Mclain LICSW  TOPIC: MH EBP Group: Coping Skills  [facility] Adult Mental Health Outpatient Programs  TRACK: IOP 1    NUMBER OF PARTICIPANTS: 8    Summary of Group / Topics Discussed:  Coping Skills: Grounding: Patients discussed and practiced strategies to increase attachment / presence to the current moment.  Patients identified situations in which using these strategies will help improve emotion regulation sense of calm in the body.  Reviewed the benefits of applying grounding strategies, as well as past / current practices of each member.  Patients identified situations in which using these strategies would reduce stress. They developed the ability to distinguish when these strategies can be useful in their lives for management and stress and psychological well-being.    Patient Session Goals / Objectives:  Understand the purpose of using grounding strategies to reduce stress.  Verbalize understanding of how and when to apply grounding strategies to reduce distress and increase presence in the moment.  Review patients current grounding practices and discuss a more formal way of practicing and accessing skills.  Practice using various calming strategies (e.g. 5-4-3-2-1; mental and body awareness).  Choose 1-2 grounding strategies to apply during times of distress.      Patient Participation / Response:  Fully participated with the group by sharing personal reflections / insights and openly received / provided feedback with other participants.    Demonstrated understanding of topics discussed through group discussion and participation, Expressed understanding of the relevance / importance of coping skills at distressing times in life, Demonstrated knowledge of when to consider using a variety of  coping skills in daily life, Identified barriers to applying coping skills, Identified 2-3 positive coping strategies that have helped maintain / improve symptoms in the past, Practiced 2-3 new coping skills in session, and Identified / Expressed personal readiness to practice new coping skills    Treatment Plan:  Patient has a current master individualized treatment plan.  See Epic treatment plan for more information.    WILLIAMS GallegoSW

## 2024-03-04 NOTE — GROUP NOTE
"Process Group Note    PATIENT'S NAME: Mariah Gaytan  MRN:   [id number]  :   1985  ACCT. NUMBER: [id number]  DATE OF SERVICE: 3/04/24  START TIME:  9:00 AM  END TIME:  9:50 AM  FACILITATOR: Katina Peraza LICSW  TOPIC:  Process Group    Diagnoses:  Principal DSM5 Diagnoses  (Sustained by DSM5 Criteria Listed Above):   296.32 (F33.1) Major Depressive Disorder, Recurrent Episode, Moderate _ and With melancholic features.  3. Other Diagnoses that is relevant to services:   300.02 (F41.1) Generalized Anxiety Disorder.    [facility] Mental Health Outpatient Programs  TRACK: IOP 1    NUMBER OF PARTICIPANTS: 8        Data:    Session content: At the start of this group, patients were invited to check in by identifying themselves, describing their current emotional status, and identifying issues to address in this group.   Area(s) of treatment focus addressed in this session included Symptom Management and Personal Safety.  Pt reports feeling \"tired and anxious\". She reports a stressful day on Friday, due to resigning from her job. Pt has not heard from her boss and feels this is \"both shitty, but validating\". Pt enjoyed her birthday yesterday. She celebrated with friends and brother. Pt feels grateful that her other brother has bought her a ticket to visit him in [facility]. She identifies coping skills as: staying in the moment and self-compassion. Denies safety concerns.    Therapeutic Interventions/Treatment Strategies:  Psychotherapist offered support, feedback and validation and reinforced use of skills. Treatment modalities used include Cognitive Behavioral Therapy. Interventions include Behavioral Activation: Explored how behaviors effect mood and interact with thoughts and feelings and Coping Skills: Discussed use of self-soothe skills to decrease distress in the body, Assisted patient in identifying 1-2 healthy distraction skills to reduce overall distress, and Discussed how the use of " "intentional \"in the moment\" actions can help reduce distress.    Assessment:    Patient response:   Patient responded to session by accepting feedback, giving feedback, listening, focusing on goals, being attentive, and accepting support    Possible barriers to participation / learning include: and no barriers identified    Health Issues:   None reported       Substance Use Review:   Substance Use: cannabis .     Mental Status/Behavioral Observations  Appearance:   Appropriate   Eye Contact:   Good   Psychomotor Behavior: Normal   Attitude:   Cooperative   Orientation:   All  Speech   Rate / Production: Normal    Volume:  Normal   Mood:    Anxious  Normal  Affect:    Appropriate   Thought Content:   Clear  Thought Form:  Coherent  Logical     Insight:    Good     Plan:   Safety Plan: No current safety concerns identified.  Recommended that patient call 911 or go to the local ED should there be a change in any of these risk factors.   Barriers to treatment: None identified  Patient Contracts (see media tab):  None  Substance Use: Not addressed in session   Continue or Discharge: Patient will continue in Adult Day Treatment (ADT)  as planned. Patient is likely to benefit from learning and using skills as they work toward the goals identified in their treatment plan.      Katina Back, LICSW  March 4, 2024  "

## 2024-03-05 ENCOUNTER — HOSPITAL ENCOUNTER (OUTPATIENT)
Dept: BEHAVIORAL HEALTH | Facility: CLINIC | Age: 39
Discharge: HOME | End: 2024-03-05
Attending: PSYCHIATRY & NEUROLOGY
Payer: COMMERCIAL

## 2024-03-05 PROCEDURE — 90853 GROUP PSYCHOTHERAPY: CPT

## 2024-03-05 NOTE — GROUP NOTE
Psychotherapy Group Note    PATIENT'S NAME: Mariah Gaytan  MRN:   [id number]  :   1985  ACCT. NUMBER: [id number]  DATE OF SERVICE: 3/05/24  START TIME: 10:00 AM  END TIME: 10:50 AM  FACILITATOR: Katina Peraza LICSW  TOPIC: MH EBP Group: Coping Skills  [facility] Adult Mental Health Outpatient Programs  TRACK: IOP 1    NUMBER OF PARTICIPANTS: 8    Summary of Group / Topics Discussed:  Coping Skills: Additional Coping Skills/Humor part 2:  Patients discussed the mental health benefits of laughter.  Reviewed the benefits of applying the aforementioned coping strategies.  Patients explored how these strategies might be applied to daily stressors or distressing situations.    Patient Session Goals / Objectives:  Understand the purpose and benefits of applying humor coping strategies  Identified ways to increase laughter  Address barriers to utilizing coping skills when in distress.      Patient Participation / Response:  Fully participated with the group by sharing personal reflections / insights and openly received / provided feedback with other participants.    Demonstrated understanding of topics discussed through group discussion and participation, Expressed understanding of the relevance / importance of coping skills at distressing times in life, and Identified / Expressed personal readiness to practice new coping skills    Treatment Plan:  Patient has a current master individualized treatment plan.  See Epic treatment plan for more information.    CATARINA Chandler

## 2024-03-05 NOTE — GROUP NOTE
Process Group Note    PATIENT'S NAME: Mariah Gaytan  MRN:   [id number]  :   1985  ACCT. NUMBER: [id number]  DATE OF SERVICE: 3/05/24  START TIME:  9:00 AM  END TIME:  9:50 AM  FACILITATOR: Katina Peraza LICSW  TOPIC:  Process Group    Diagnoses:  Principal DSM5 Diagnoses  (Sustained by DSM5 Criteria Listed Above):   296.32 (F33.1) Major Depressive Disorder, Recurrent Episode, Moderate _ and With melancholic features.  3. Other Diagnoses that is relevant to services:   300.02 (F41.1) Generalized Anxiety Disorder.    [facility] Mental Health Outpatient Programs  TRACK: IOP 1    NUMBER OF PARTICIPANTS: 8        Data:    Session content: At the start of this group, patients were invited to check in by identifying themselves, describing their current emotional status, and identifying issues to address in this group.   Area(s) of treatment focus addressed in this session included Symptom Management, Personal Safety, and Community Resources/Discharge Planning.  Pt reports worrying about finances and her career. She is trying to stay in the moment. She is looking forward to her trip to [facility] and will make a list of things to do. Pt enjoyed time with a friend and a phone conversation with her father. Pt identifies coping skills as: not comparing herself with others and using self-compassion. Denies safety concerns.    Therapeutic Interventions/Treatment Strategies:  Psychotherapist offered support, feedback and validation and reinforced use of skills. Treatment modalities used include Cognitive Behavioral Therapy. Interventions include Behavioral Activation: Explored how behaviors effect mood and interact with thoughts and feelings and Cognitive Restructuring:  Explored impact of ineffective thoughts / distortions on mood and activity and Assisted patient in identifying new neutral/positive core beliefs.    Assessment:    Patient response:   Patient responded to session by accepting feedback,  giving feedback, listening, focusing on goals, being attentive, and accepting support    Possible barriers to participation / learning include: and no barriers identified    Health Issues:   None reported       Substance Use Review:   Substance Use: No active concerns identified.    Mental Status/Behavioral Observations  Appearance:   Appropriate   Eye Contact:   Good   Psychomotor Behavior: Normal   Attitude:   Cooperative   Orientation:   All  Speech   Rate / Production: Normal    Volume:  Normal   Mood:    Anxious   Affect:    Appropriate   Thought Content:   Clear  Thought Form:  Coherent  Logical     Insight:    Good     Plan:   Safety Plan: No current safety concerns identified.  Recommended that patient call 911 or go to the local ED should there be a change in any of these risk factors.   Barriers to treatment: None identified  Patient Contracts (see media tab):  None  Substance Use: Not addressed in session   Continue or Discharge: Patient will continue in Adult Day Treatment (ADT)  as planned. Patient is likely to benefit from learning and using skills as they work toward the goals identified in their treatment plan.      Katina Back, Northern Maine Medical CenterSW  March 5, 2024

## 2024-03-06 ENCOUNTER — HOSPITAL ENCOUNTER (OUTPATIENT)
Dept: BEHAVIORAL HEALTH | Facility: CLINIC | Age: 39
Discharge: HOME | End: 2024-03-06
Attending: PSYCHIATRY & NEUROLOGY
Payer: COMMERCIAL

## 2024-03-06 PROCEDURE — 90853 GROUP PSYCHOTHERAPY: CPT

## 2024-03-06 NOTE — GROUP NOTE
Psychotherapy Group Note    PATIENT'S NAME: Mariah Gaytan  MRN:   [id number]  :   1985  ACCT. NUMBER: [id number]  DATE OF SERVICE: 3/06/24  START TIME: 10:00 AM  END TIME: 10:50 AM  FACILITATOR: Katina Peraza LICSW  TOPIC: MH EBP Group: Behavioral Activation  [facility] Adult Mental Health Outpatient Programs  TRACK: IOP 1    NUMBER OF PARTICIPANTS: 7    Summary of Group / Topics Discussed:  Behavioral Activation: [place] Ahead: {Patients identified situations that prompt unwanted and unhelpful emotions / thoughts / behaviors.   Patients discussed how to problem solve by proactively using coping skills in potentially difficult situations. Components included describing the situation, brainstorming coping skills, imagining how scenario can/will unfold, rehearsing the action plan, and practicing relaxation to follow.  Patients practiced using these skills to reduce symptom distress and increase effective coping  behaviors.      Patient Session Goals / Objectives:  Identify difficult situation(s), and gain proficiency with alternative behaviors / skills to problem solve.  Increase confidence using coping skills through group practice in session.  Receive and provide feedback regarding skill development.  Apply coping skills in daily life situations.      Patient Participation / Response:  Fully participated with the group by sharing personal reflections / insights and openly received / provided feedback with other participants.    Demonstrated understanding of topics discussed through group discussion and participation, Expressed understanding of the relationship between behaviors, thoughts, and feelings, Identified barriers to change, and Identified / Expressed personal readiness to make behavioral change    Treatment Plan:  Patient has a current master individualized treatment plan.  See Epic treatment plan for more information.    CATARINA Chandler

## 2024-03-06 NOTE — GROUP NOTE
Psychotherapy Group Note    PATIENT'S NAME: Mariah Gaytan  MRN:   [id number]  :   1985  ACCT. NUMBER: [id number]  DATE OF SERVICE: 3/06/24  START TIME: 11:00 AM  END TIME: 11:50 AM  FACILITATOR: Bernice Mclain LICSW  TOPIC: MH EBP Group: Behavioral Activation  [facility] Mental Health Outpatient Programs  TRACK: IOP 1    NUMBER OF PARTICIPANTS: 7    Summary of Group / Topics Discussed:  Life Skills:  Decision Making:  Patient's reviewed a process for decision making and strategies for making effective decisions.  Barriers to decision making were discussed    Patient Session Goals / Objectives:       Review process for making decisions       Patients learn strategies for making effective decisions       Patients wrote out a pros and cons list for a decision and identified barriers to decision making.      Patient Participation / Response:  Fully participated with the group by sharing personal reflections / insights and openly received / provided feedback with other participants.    Demonstrated understanding of topics discussed through group discussion and participation, Expressed understanding of the relationship between behaviors, thoughts, and feelings, Shared experiences and challenges with making behavioral changes, Identified barriers to change, Identified / Expressed personal readiness to make behavioral change, Identified ways to increase goal directed activities, and Practiced skills in session    Treatment Plan:  Patient has a current master individualized treatment plan.  See Epic treatment plan for more information.    CATARINA Gallego

## 2024-03-07 ENCOUNTER — HOSPITAL ENCOUNTER (OUTPATIENT)
Dept: BEHAVIORAL HEALTH | Facility: CLINIC | Age: 39
Discharge: HOME | End: 2024-03-07
Attending: PSYCHIATRY & NEUROLOGY
Payer: COMMERCIAL

## 2024-03-07 ENCOUNTER — TELEPHONE (OUTPATIENT)
Dept: BEHAVIORAL HEALTH | Facility: CLINIC | Age: 39
End: 2024-03-07
Payer: COMMERCIAL

## 2024-03-07 PROCEDURE — 90853 GROUP PSYCHOTHERAPY: CPT

## 2024-03-07 PROCEDURE — 90853 GROUP PSYCHOTHERAPY: CPT | Performed by: SOCIAL WORKER

## 2024-03-07 NOTE — TELEPHONE ENCOUNTER
----- Message from CATARINA Gallego sent at 3/7/2024 11:19 AM CST -----  Regarding: Pt. D/C from IOP 1  Mariah is discharging from IOP 1(M-TH 9-Noon) effective today 3/7/2024.  Please remove any future appointments from the [facility].    All the best,    CATARINA Gallego

## 2024-03-07 NOTE — GROUP NOTE
Psychotherapy Group Note    PATIENT'S NAME: Mariah Gaytan  MRN:   [id number]  :   1985  ACCT. NUMBER: [id number]  DATE OF SERVICE: 3/07/24  START TIME: 10:00 AM  END TIME: 10:50 AM  FACILITATOR: Bernice Mclain LICSW  TOPIC:  EBP Group: Self-Awareness  [facility] Mental Health Outpatient Programs  TRACK: IOP 1    NUMBER OF PARTICIPANTS: 6    Summary of Group / Topics Discussed:  Self-Awareness: Positive Self-Affirmations: Topic focused on assisting patients in identifying core traits and creating positive self-affirmations. Patients discussed the benefits of acknowledging their personal strengths and their impact on mood improvement, mindfulness, and perspective. The goal is to reduce rumination and negative thinking resulting in increased mindfulness and resilience. Patients will work to put skills into practice and problem-solve barriers.     Patient Session Goals / Objectives:  Identified positive core traits  Created three positive self-affirmations  Verbalized understanding of strategies to increase use of affirmations in management of daily symptoms      Patient Participation / Response:  Fully participated with the group by sharing personal reflections / insights and openly received / provided feedback with other participants.    Demonstrated understanding of topics discussed through group discussion and participation, Demonstrated understanding of values, strengths, and challenges to learn about themselves, Identified / Expressed readiness to act intentionally, increase self-compassion, promote personal growth, Verbalized understanding of ways to proactively manage illness, Identified plan to address barriers to practicing skills that promote self-awareness , and Practiced skills in session    Treatment Plan:  Patient has See Epic Treatment Plan - Patient is discharging.    CATARINA Gallego

## 2024-03-07 NOTE — GROUP NOTE
"Process Group Note    PATIENT'S NAME: Mariah Gaytan  MRN:   [id number]  :   1985  ACCT. NUMBER: [id number]  DATE OF SERVICE: 3/07/24  START TIME:  9:00 AM  END TIME:  9:50 AM  FACILITATOR: Bernice Mclain LICSW  TOPIC:  Process Group    Diagnoses:  Principal DSM5 Diagnoses  (Sustained by DSM5 Criteria Listed Above):   296.32 (F33.1) Major Depressive Disorder, Recurrent Episode, Moderate _ and With melancholic features.  3. Other Diagnoses that is relevant to services:   300.02 (F41.1) Generalized Anxiety Disorder.    [facility]  TRACK: IOP 1    NUMBER OF PARTICIPANTS: 6        Data:    Session content: At the start of this group, patients were invited to check in by identifying themselves, describing their current emotional status, and identifying issues to address in this group.   Area(s) of treatment focus addressed in this session included Symptom Management, Personal Safety, and Community Resources/Discharge Planning.  Mariah attended her last day in IOP 1. Reported mood is stable.  She felt productive yesterday completing errands.  She is \"jazzed\" to be graduating from [employer] today.   Client denied safety concerns, including SI and SIB. Client denies any chemical use.  Client is taking medications as prescribed. Client's goal is \"to celebrate\".  Client reported plans to use the following coping skills: self-compassion, humor, distraction. Client talked about going to [facility] to visit her brother next week. She plans on finding a new job and place to live. Peers offered best wishes and appreciation on her last day.    Therapeutic Interventions/Treatment Strategies:  Psychotherapist offered support, feedback and validation and reinforced use of skills. Treatment modalities used include Cognitive Behavioral Therapy and Dialectical Behavioral Therapy. Interventions include Coping Skills: Assisted patient in identifying 1-2 healthy distraction skills to reduce " overall distress and Reviewed patients current calming practices and discussed a more formal way of practicing and accessing skills and Other: Explored with patient how life transitions may impact mental health and functioning .    Assessment:    Patient response:   Patient responded to session by accepting feedback, giving feedback, listening, focusing on goals, being attentive, and accepting support    Possible barriers to participation / learning include: and no barriers identified    Health Issues:   None reported       Substance Use Review:   Substance Use: No active concerns identified.    Mental Status/Behavioral Observations  Appearance:   Appropriate   Eye Contact:   Good   Psychomotor Behavior: Normal   Attitude:   Cooperative  Interested Friendly Pleasant  Orientation:   All  Speech   Rate / Production: Normal/ Responsive Normal    Volume:  Normal   Mood:    Normal  Affect:    Appropriate   Thought Content:   Clear and Safety denies any current safety concerns including suicidal ideation, self-harm, and homicidal ideation  Thought Form:  Coherent  Logical     Insight:    Good     Plan:   Safety Plan: No current safety concerns identified.  Recommended that patient call 911 or go to the local ED should there be a change in any of these risk factors.   Barriers to treatment: None identified  Patient Contracts (see media tab):  None  Substance Use: Not addressed in session   Continue or Discharge: Patient is being discharged today. See Treatment Plan and Discharge Summary.       CATARINA Gallego  March 7, 2024

## 2024-03-07 NOTE — GROUP NOTE
Psychoeducation Group Note    PATIENT'S NAME: Mariah Gaytan  MRN:   [id number]  :   1985  ACCT. NUMBER: [id number]  DATE OF SERVICE: 3/07/24  START TIME: 11:00 AM  END TIME: 11:50 AM  FACILITATOR: Carina Ny LICSW  TOPIC:  Wellness Group: Health Maintenance  [facility]  TRACK: IOP 1    NUMBER OF PARTICIPANTS: 6    Summary of Group / Topics Discussed:  Health Maintenance: Weekend planning: Patients were given time to complete a weekend plan of what they will do to promote wellness and sobriety over the weekend when they do not have the structure of group. Patients were encouraged to review progress on their treatment goals and were challenged to identify ways to work toward meeting them. Patients identified and discussed foreseeable barriers to success over the weekend and then developed a plan to overcome them. Patients reviewed their distress coping skills and social support network and discussed this with the group.       Patient Session Goals / Objectives:    ?    Identified activities to engage in that promote balance in wellness  ?    Distinguished possible barriers to success over the weekend and created a plan to overcome them  ?    Listed distress coping skills and identified social support network to utilize if in crisis during the weekend        Patient Participation / Response:  Fully participated with the group by sharing personal reflections / insights and openly received / provided feedback with other participants.    Demonstrated understanding of topics discussed through group discussion and participation and Verbalized understanding of health maintenance topic    Treatment Plan:  Patient has See Epic Treatment Plan - Patient is discharging.    CATARINA Gambino

## 2024-03-07 NOTE — TELEPHONE ENCOUNTER
----- Message from Dru Og sent at 3/7/2024  7:57 AM CST -----  Regarding: Add Appt 3/7  Hi [facility] team,     Please add an appt for today, Mar 7    Location of programming: [facility]   Group: Adult [facility] IOP 1  Attending Provider: Adin Hannon   Duration of Appointment in minutes: 180 minutes   Visit Type: Treatment [870]       Thank you

## 2025-02-08 ENCOUNTER — HEALTH MAINTENANCE LETTER (OUTPATIENT)
Age: 40
End: 2025-02-08